# Patient Record
Sex: FEMALE | Race: WHITE | NOT HISPANIC OR LATINO | Employment: OTHER | URBAN - METROPOLITAN AREA
[De-identification: names, ages, dates, MRNs, and addresses within clinical notes are randomized per-mention and may not be internally consistent; named-entity substitution may affect disease eponyms.]

---

## 2017-07-01 ENCOUNTER — TELEPHONE (OUTPATIENT)
Dept: FAMILY MEDICINE | Facility: CLINIC | Age: 67
End: 2017-07-01

## 2017-07-01 NOTE — TELEPHONE ENCOUNTER
Please abstract the following data from this visit with this patient into the appropriate field in Epic:    Mammogram done on this date: Nov. 2016 (approximately), by this group: [facility], results were normal.

## 2017-07-13 ENCOUNTER — TRANSFERRED RECORDS (OUTPATIENT)
Dept: HEALTH INFORMATION MANAGEMENT | Facility: CLINIC | Age: 67
End: 2017-07-13

## 2017-09-29 ENCOUNTER — TRANSFERRED RECORDS (OUTPATIENT)
Dept: HEALTH INFORMATION MANAGEMENT | Facility: CLINIC | Age: 67
End: 2017-09-29

## 2017-12-29 ENCOUNTER — TRANSFERRED RECORDS (OUTPATIENT)
Dept: HEALTH INFORMATION MANAGEMENT | Facility: CLINIC | Age: 67
End: 2017-12-29

## 2018-07-02 ENCOUNTER — TRANSFERRED RECORDS (OUTPATIENT)
Dept: HEALTH INFORMATION MANAGEMENT | Facility: CLINIC | Age: 68
End: 2018-07-02

## 2018-12-10 ENCOUNTER — TRANSFERRED RECORDS (OUTPATIENT)
Dept: HEALTH INFORMATION MANAGEMENT | Facility: CLINIC | Age: 68
End: 2018-12-10

## 2018-12-26 ENCOUNTER — TRANSFERRED RECORDS (OUTPATIENT)
Dept: HEALTH INFORMATION MANAGEMENT | Facility: CLINIC | Age: 68
End: 2018-12-26

## 2019-03-27 ENCOUNTER — TRANSFERRED RECORDS (OUTPATIENT)
Dept: HEALTH INFORMATION MANAGEMENT | Facility: CLINIC | Age: 69
End: 2019-03-27

## 2019-05-03 ENCOUNTER — HOSPITAL ENCOUNTER (OUTPATIENT)
Dept: CT IMAGING | Facility: CLINIC | Age: 69
Discharge: HOME | End: 2019-05-03
Attending: PSYCHIATRY & NEUROLOGY | Admitting: PSYCHIATRY & NEUROLOGY
Payer: COMMERCIAL

## 2019-05-03 DIAGNOSIS — K11.7 DROOLING: ICD-10-CM

## 2019-05-03 LAB
CREAT BLD-MCNC: 1.1 MG/DL (ref 0.52–1.04)
GFR SERPL CREATININE-BSD FRML MDRD: 49 ML/MIN/{1.73_M2}

## 2019-05-03 PROCEDURE — 70470 CT HEAD/BRAIN W/O & W/DYE: CPT

## 2019-05-03 PROCEDURE — 82565 ASSAY OF CREATININE: CPT

## 2019-05-03 PROCEDURE — 25000128 H RX IP 250 OP 636: Performed by: PSYCHIATRY & NEUROLOGY

## 2019-05-03 RX ORDER — IOPAMIDOL 755 MG/ML
500 INJECTION, SOLUTION INTRAVASCULAR ONCE
Status: COMPLETED | OUTPATIENT
Start: 2019-05-03 | End: 2019-05-03

## 2019-05-03 RX ADMIN — IOPAMIDOL 50 ML: 755 INJECTION, SOLUTION INTRAVENOUS at 09:41

## 2019-05-03 RX ADMIN — SODIUM CHLORIDE 30 ML: 9 INJECTION, SOLUTION INTRAVENOUS at 09:44

## 2019-06-27 ENCOUNTER — TRANSFERRED RECORDS (OUTPATIENT)
Dept: HEALTH INFORMATION MANAGEMENT | Facility: CLINIC | Age: 69
End: 2019-06-27

## 2019-09-25 ENCOUNTER — TRANSFERRED RECORDS (OUTPATIENT)
Dept: HEALTH INFORMATION MANAGEMENT | Facility: CLINIC | Age: 69
End: 2019-09-25

## 2019-10-01 ENCOUNTER — HEALTH MAINTENANCE LETTER (OUTPATIENT)
Age: 69
End: 2019-10-01

## 2019-12-15 ENCOUNTER — HEALTH MAINTENANCE LETTER (OUTPATIENT)
Age: 69
End: 2019-12-15

## 2019-12-20 ENCOUNTER — TRANSFERRED RECORDS (OUTPATIENT)
Dept: HEALTH INFORMATION MANAGEMENT | Facility: CLINIC | Age: 69
End: 2019-12-20

## 2019-12-20 LAB — RETINOPATHY: NEGATIVE

## 2020-03-09 ENCOUNTER — OFFICE VISIT (OUTPATIENT)
Dept: FAMILY MEDICINE | Facility: CLINIC | Age: 70
End: 2020-03-09
Payer: COMMERCIAL

## 2020-03-09 ENCOUNTER — TELEPHONE (OUTPATIENT)
Dept: FAMILY MEDICINE | Facility: CLINIC | Age: 70
End: 2020-03-09

## 2020-03-09 VITALS
HEIGHT: 65 IN | HEART RATE: 78 BPM | RESPIRATION RATE: 20 BRPM | SYSTOLIC BLOOD PRESSURE: 120 MMHG | WEIGHT: 215 LBS | BODY MASS INDEX: 35.82 KG/M2 | DIASTOLIC BLOOD PRESSURE: 60 MMHG | TEMPERATURE: 98.2 F

## 2020-03-09 DIAGNOSIS — E66.01 MORBID OBESITY (H): ICD-10-CM

## 2020-03-09 DIAGNOSIS — H33.22 LEFT RETINAL DETACHMENT: ICD-10-CM

## 2020-03-09 DIAGNOSIS — E11.9 CONTROLLED TYPE 2 DIABETES MELLITUS WITHOUT COMPLICATION, WITHOUT LONG-TERM CURRENT USE OF INSULIN (H): ICD-10-CM

## 2020-03-09 DIAGNOSIS — Z01.818 PREOP GENERAL PHYSICAL EXAM: Primary | ICD-10-CM

## 2020-03-09 DIAGNOSIS — F32.0 MILD MAJOR DEPRESSION (H): ICD-10-CM

## 2020-03-09 LAB — HBA1C MFR BLD: 5.9 % (ref 0–5.6)

## 2020-03-09 PROCEDURE — 99204 OFFICE O/P NEW MOD 45 MIN: CPT | Performed by: NURSE PRACTITIONER

## 2020-03-09 PROCEDURE — 83036 HEMOGLOBIN GLYCOSYLATED A1C: CPT | Performed by: NURSE PRACTITIONER

## 2020-03-09 PROCEDURE — 36415 COLL VENOUS BLD VENIPUNCTURE: CPT | Performed by: NURSE PRACTITIONER

## 2020-03-09 RX ORDER — BUPROPION HYDROCHLORIDE 300 MG/1
TABLET ORAL
COMMUNITY
Start: 2014-07-01

## 2020-03-09 ASSESSMENT — MIFFLIN-ST. JEOR: SCORE: 1496.11

## 2020-03-09 NOTE — PROGRESS NOTES
[address], [address]  [facility] [address]  [phone]  Dept: [phone]    PRE-OP EVALUATION:  Today's date: 3/9/2020    David Mckeon (: 1950) presents for pre-operative evaluation assessment as requested by Dr. Frank Rogel.  She requires evaluation and anesthesia risk assessment prior to undergoing surgery/procedure for treatment of left Vitrectomy Posterior .    Fax number for surgical facility: [phone]  Primary Physician: Bibi Orta  Type of Anesthesia Anticipated: Sedation    Patient has a Health Care Directive or Living Will:  YES on file    Preop Questions 3/6/2020   Who is doing your surgery? Frank Rogel MD   What are you having done? repair of retina detachment left eye   Date of Surgery/Procedure: 2020   Facility or Hospital where procedure/surgery will be performed: [facility]   1.  Do you have a history of Heart attack, stroke, stent, coronary bypass surgery, or other heart surgery? No   2.  Do you ever have any pain or discomfort in your chest? No   3.  Do you have a history of  Heart Failure? No   4.   Are you troubled by shortness of breath when:  walking on a level surface, or up a slight hill, or at night? No   5.  Do you currently have a cold, bronchitis or other respiratory infection? No   6.  Do you have a cough, shortness of breath, or wheezing? No   7.  Do you sometimes get pains in the calves of your legs when you walk? No   8. Do you or anyone in your family have previous history of blood clots? No   9.  Do you or does anyone in your family have a serious bleeding problem such as prolonged bleeding following surgeries or cuts? No   10. Have you ever had problems with anemia or been told to take iron pills? No   11. Have you had any abnormal blood loss such as black, tarry or bloody stools, or abnormal vaginal bleeding? No   12. Have you ever had a blood transfusion? No   13. Have  you or any of your relatives ever had problems with anesthesia? No   14. Do you have sleep apnea, excessive snoring or daytime drowsiness? No   15. Do you have any prosthetic heart valves? No   16. Do you have prosthetic joints? YES - right knee   17. Is there any chance that you may be pregnant? No         HPI:     HPI related to upcoming procedure: Was seen for eye exam in December.  Had subsequent eye exam and told she had a detached retina.        Hx of T2DM and HTN.  More recently this has been managed by [facility].  She reports her A1c is usually between 6.5-7.0 and her BP has been well controlled on medications.     See problem list for active medical problems.  Problems all longstanding and stable, except as noted/documented.  See ROS for pertinent symptoms related to these conditions.      MEDICAL HISTORY:     Patient Active Problem List    Diagnosis Date Noted     ACP (advance care planning) 08/10/2016     Priority: Medium     Advance Care Planning 8/10/2016: Receipt of ACP document:  Received: Health Care Directive which was witnessed or notarized on 1/5/07.  Document previously scanned on 5/26/16.  Validation form completed and sent to be scanned.  Code Status reflects choices in most recent ACP document.  Confirmed/documented designated decision maker(s).  Added by Justine Leal RN, Advance Care Planning Liaison.         S/P total knee replacement using cement, right 05/23/2016     Priority: Medium     Type 2 diabetes mellitus, controlled (H) 05/06/2016     Priority: Medium     Essential hypertension, benign 05/06/2016     Priority: Medium     Morbid obesity (H) 10/25/2015     Priority: Medium     Diverticulosis of large intestine without hemorrhage 07/28/2015     Priority: Medium     Hemorrhoids 07/28/2015     Priority: Medium     S/P colonoscopy with polypectomy 07/28/2015     Priority: Medium     Mild major depression (H) 06/30/2014     Priority: Medium     Quality         Hyperlipidemia with target  LDL less than 100 06/30/2014     Priority: Medium     Diagnosis updated by automated process. Provider to review and confirm.       History of colonic polyps 05/19/2014     Priority: Medium     CARDIOVASCULAR SCREENING; LDL GOAL LESS THAN 130 05/19/2014     Priority: Medium      Past Medical History:   Diagnosis Date     High cholesterol      Hypertension      Mild major depression (H) 6/30/2014     Osteoarthritis     right knee     Type 2 diabetes mellitus (H)      Past Surgical History:   Procedure Laterality Date     ARTHROPLASTY KNEE Right 5/23/2016    Procedure: ARTHROPLASTY KNEE;  Surgeon: Pankaj Schuster MD;  Location: [facility] OR     CARPAL TUNNEL RELEASE RT/LT Bilateral      COLONOSCOPY  6/2015     EYE SURGERY      glasses     SOFT TISSUE SURGERY      carpal tunnel-both hands     STAPEDECTOMY Bilateral      Current Outpatient Medications   Medication Sig Dispense Refill     ACE NOT PRESCRIBED, INTENTIONAL, 1 each ACE Inhibitor not prescribed due to Allergy 0 each 0     amLODIPine (NORVASC) 10 MG tablet Take 1 tablet (10 mg) by mouth daily 90 tablet 0     aspirin (ASA) 81 MG EC tablet Take 81 mg by mouth daily       buPROPion (WELLBUTRIN XL) 300 MG 24 hr tablet        calcium carbonate (OS-SAM) 1500 (600 Ca) MG tablet Take by mouth 2 times daily (with meals)       carvedilol (COREG) 6.25 MG tablet Take 1 tablet (6.25 mg) by mouth 2 times daily (with meals) 60 tablet 1     Cholecalciferol (VITAMIN D3) 2000 UNITS CAPS Take 1 capsule by mouth 2 times daily        hydrochlorothiazide (MICROZIDE) 12.5 MG capsule Take 2 capsules (25 mg) by mouth daily 60 capsule 1     losartan (COZAAR) 100 MG tablet Take 100 mg by mouth daily       metFORMIN (GLUCOPHAGE) 1000 MG tablet Take 1,000 mg by mouth 2 times daily (with meals)       multivitamin, therapeutic with minerals (THERA-VIT-M) TABS Take 1 tablet by mouth daily       phentermine (ADIPEX-P) 37.5 MG tablet Take 37.5 mg by mouth every morning (before breakfast)        acetaminophen (TYLENOL) 325 MG tablet Take 2 tablets (650 mg) by mouth every 4 hours as needed for other (surgical pain) 50 tablet 0     aspirin  MG EC tablet Take 1 tablet (162 mg) by mouth daily 42 tablet 0     blood glucose (ACCU-CHEK ARTHUR PLUS) test strip Use to test blood sugar 3 times daily or as directed. 3 Box 3     buPROPion (WELLBUTRIN XL) 150 MG 24 hr tablet Take 1 tablet (150 mg) by mouth every morning 90 tablet 0     Calcium Polycarbophil (FIBER-CAPS PO) Take 1,500 mg by mouth 2 times daily 1.5 PILLS IN THE am AND 1 PILL IN THE pm       Calcium-Magnesium-Vitamin D (CALCIUM 500 PO) Take 3 tablets by mouth 2 times daily       canaliflozin (INVOKANA) tablet Take 300 mg by mouth every morning (before breakfast)       docusate sodium (COLACE) 100 MG tablet Take 100 mg by mouth 3 times daily       metFORMIN (GLUCOPHAGE) 850 MG tablet Take 1 tablet (850 mg) by mouth 2 times daily (with meals) 180 tablet 0     order for DME Equipment being ordered: Walker Wheels () and Walker ()  Treatment Diagnosis: gait impairment s/p R TKA 1 each 0     oxyCODONE (ROXICODONE) 5 MG immediate release tablet Take 1-2 tablets (5-10 mg) by mouth every 4 hours as needed for moderate to severe pain 48 tablet 0     simvastatin (ZOCOR) 20 MG tablet Take 1 tablet (20 mg) by mouth At Bedtime 90 tablet 0     OTC products: None, except as noted above    Allergies   Allergen Reactions     Aleve [Naproxen] Nausea     Ibuprofen Nausea     Lisinopril Cough     DRY COUGH      Latex Allergy: NO    Social History     Tobacco Use     Smoking status: Former Smoker     Packs/day: 0.00     Years: 35.00     Pack years: 0.00     Types: Cigarettes     Start date: 1968     Last attempt to quit: 2003     Years since quittin.7     Smokeless tobacco: Never Used   Substance Use Topics     Alcohol use: Yes     Comment: occasional     History   Drug Use No       REVIEW OF SYSTEMS:   CONSTITUTIONAL: NEGATIVE for fever,  "chills, change in weight  ENT/MOUTH: NEGATIVE for ear, mouth and throat problems  RESP: NEGATIVE for significant cough or SOB  CV: NEGATIVE for chest pain, palpitations or peripheral edema    EXAM:   /60 (BP Location: Right arm, Patient Position: Sitting, Cuff Size: Adult Large)   Pulse 78   Temp 98.2  F (36.8  C) (Oral)   Resp 20   Ht 1.651 m (5' 5\")   Wt 97.5 kg (215 lb)   BMI 35.78 kg/m    GENERAL APPEARANCE: healthy, alert and no distress  HENT: ear canals and TM's normal and nose and mouth without ulcers or lesions  RESP: lungs clear to auscultation - no rales, rhonchi or wheezes  CV: regular rate and rhythm, normal S1 S2, no S3 or S4 and no murmur, click or rub   NEURO: Normal strength and tone, sensory exam grossly normal, mentation intact and speech normal    DIAGNOSTICS:   No labs or EKG required for low risk surgery (cataract, skin procedure, breast biopsy, etc)    Recent Labs   Lab Test 05/25/16  0643 05/24/16  0625 05/23/16  1330 05/06/16  1134 12/03/15  12/15/14 10/30/14  0828  05/19/14  1045   HGB 12.0 12.2  --  13.6  --   --   --  13.5  --  15.4   PLT  --   --   --  366  --   --   --  401  --  362   NA  --   --   --   --   --   --   --  137  --  138   POTASSIUM  --   --  3.9  --   --   --  4.4 4.2  --  4.6   CR 0.71  --  0.73  --  0.68  --  0.73 0.65  --  0.66   A1C  --   --   --  6.0 5.9   < > 6.1* 6.3*   < > 11.5*    < > = values in this interval not displayed.      Results for orders placed or performed in visit on 03/09/20   HEMOGLOBIN A1C     Status: Abnormal   Result Value Ref Range    Hemoglobin A1C 5.9 (H) 0 - 5.6 %       IMPRESSION:   Reason for surgery/procedure: detached retina   Diagnosis/reason for consult: preoperative evaluation     The proposed surgical procedure is considered LOW risk.    REVISED CARDIAC RISK INDEX  The patient has the following serious cardiovascular risks for perioperative complications such as (MI, PE, VFib and 3  AV Block):  No serious cardiac " risks  INTERPRETATION: 0 risks: Class I (very low risk - 0.4% complication rate)    The patient has the following additional risks for perioperative complications:  No identified additional risks      ICD-10-CM    1. Preop general physical exam  Z01.818 HEMOGLOBIN A1C   2. Controlled type 2 diabetes mellitus without complication, without long-term current use of insulin (H)  E11.9 HEMOGLOBIN A1C, controlled   3. Left retinal detachment  H33.22    4. Mild major depression (H)  F32.0 Daily wellbutrin    5. Morbid obesity (H)  E66.01        RECOMMENDATIONS:         --Patient is to take all scheduled medications on the day of surgery EXCEPT for modifications listed below.    Diabetes Medication Use  -----Hold usual oral and non-insulin diabetic meds (e.g. Metformin, Actos, Glipizide) while NPO.     APPROVAL GIVEN to proceed with proposed procedure, without further diagnostic evaluation       Signed Electronically by: ELVI Trujillo CNP    Copy of this evaluation report is provided to requesting physician.    [facility] Preop Guidelines    Revised Cardiac Risk Index

## 2020-03-09 NOTE — TELEPHONE ENCOUNTER
----- Message from ELVI Gonzalez CNP sent at 3/9/2020  3:36 PM CDT -----  Regarding: fax preop  Please fax preop.     Thanks,     Yaima Roman CNP

## 2020-07-01 ENCOUNTER — OFFICE VISIT (OUTPATIENT)
Dept: FAMILY MEDICINE | Facility: CLINIC | Age: 70
End: 2020-07-01
Payer: COMMERCIAL

## 2020-07-01 VITALS
TEMPERATURE: 98.3 F | DIASTOLIC BLOOD PRESSURE: 76 MMHG | SYSTOLIC BLOOD PRESSURE: 138 MMHG | WEIGHT: 228.2 LBS | HEART RATE: 76 BPM | RESPIRATION RATE: 16 BRPM | BODY MASS INDEX: 37.97 KG/M2

## 2020-07-01 DIAGNOSIS — Z01.818 PREOP GENERAL PHYSICAL EXAM: Primary | ICD-10-CM

## 2020-07-01 PROCEDURE — 99214 OFFICE O/P EST MOD 30 MIN: CPT | Performed by: FAMILY MEDICINE

## 2020-07-01 ASSESSMENT — ANXIETY QUESTIONNAIRES
IF YOU CHECKED OFF ANY PROBLEMS ON THIS QUESTIONNAIRE, HOW DIFFICULT HAVE THESE PROBLEMS MADE IT FOR YOU TO DO YOUR WORK, TAKE CARE OF THINGS AT HOME, OR GET ALONG WITH OTHER PEOPLE: NOT DIFFICULT AT ALL
1. FEELING NERVOUS, ANXIOUS, OR ON EDGE: NOT AT ALL
5. BEING SO RESTLESS THAT IT IS HARD TO SIT STILL: NOT AT ALL
GAD7 TOTAL SCORE: 0
7. FEELING AFRAID AS IF SOMETHING AWFUL MIGHT HAPPEN: NOT AT ALL
3. WORRYING TOO MUCH ABOUT DIFFERENT THINGS: NOT AT ALL
2. NOT BEING ABLE TO STOP OR CONTROL WORRYING: NOT AT ALL
6. BECOMING EASILY ANNOYED OR IRRITABLE: NOT AT ALL

## 2020-07-01 ASSESSMENT — PATIENT HEALTH QUESTIONNAIRE - PHQ9
SUM OF ALL RESPONSES TO PHQ QUESTIONS 1-9: 0
5. POOR APPETITE OR OVEREATING: NOT AT ALL

## 2020-07-01 NOTE — PATIENT INSTRUCTIONS
Continue to stop aspirin therapy until advised by eye surgeon when you can restart this.     Before Your Surgery      Call your surgeon if there is any change in your health. This includes signs of a cold or flu (such as a sore throat, runny nose, cough, rash or fever).    Do not smoke, drink alcohol or take over the counter medicine (unless your surgeon or primary care doctor tells you to) for the 24 hours before and after surgery.    If you take prescribed drugs: Follow your doctor s orders about which medicines to take and which to stop until after surgery.    Eating and drinking prior to surgery: follow the instructions from your surgeon    Take a shower or bath the night before surgery. Use the soap your surgeon gave you to gently clean your skin. If you do not have soap from your surgeon, use your regular soap. Do not shave or scrub the surgery site.  Wear clean pajamas and have clean sheets on your bed.

## 2020-07-01 NOTE — NURSING NOTE
"Chief Complaint   Patient presents with     Pre-Op Exam     Initial /76 (BP Location: Right arm, Patient Position: Sitting, Cuff Size: Adult Regular)   Pulse 76   Temp 98.3  F (36.8  C) (Oral)   Resp 16   Wt 103.5 kg (228 lb 3.2 oz)   BMI 37.97 kg/m   Estimated body mass index is 37.97 kg/m  as calculated from the following:    Height as of 3/9/20: 1.651 m (5' 5\").    Weight as of this encounter: 103.5 kg (228 lb 3.2 oz).  BP completed using cuff size regular long right arm    Lisa Magill, CMA    "

## 2020-07-01 NOTE — PROGRESS NOTES
[facility]  [id number] [place] [id number] [phone]  Dept: [phone]    PRE-OP EVALUATION:  Today's date: 2020    David Mckeon (: 1950) presents for pre-operative evaluation assessment as requested by Dr. Rogel.  She requires evaluation and anesthesia risk assessment prior to undergoing surgery/procedure for treatment of scar tissue removal of retina .    Fax number for surgical facility: [phone]  Primary Physician: Bibi Orta  Type of Anesthesia Anticipated: Local and Sedation     Patient has a Health Care Directive or Living Will:  YES     Preop Questions 2020   Who is doing your surgery? Dr rogel   What are you having done? virectomy   Date of Surgery/Procedure: 2020   Facility or Hospital where procedure/surgery will be performed: [facility]   1.  Do you have a history of Heart attack, stroke, stent, coronary bypass surgery, or other heart surgery? No   2.  Do you ever have any pain or discomfort in your chest? No   3.  Do you have a history of  Heart Failure? No   4.   Are you troubled by shortness of breath when:  walking on a level surface, or up a slight hill, or at night? No   5.  Do you currently have a cold, bronchitis or other respiratory infection? No   6.  Do you have a cough, shortness of breath, or wheezing? No   7.  Do you sometimes get pains in the calves of your legs when you walk? No   8. Do you or anyone in your family have previous history of blood clots? No   9.  Do you or does anyone in your family have a serious bleeding problem such as prolonged bleeding following surgeries or cuts? No   10. Have you ever had problems with anemia or been told to take iron pills? No   11. Have you had any abnormal blood loss such as black, tarry or bloody stools, or abnormal vaginal bleeding? No   12. Have you ever had a blood transfusion? No   13. Have you or any of your relatives ever had  problems with anesthesia? No   14. Do you have sleep apnea, excessive snoring or daytime drowsiness? No   15. Do you have any prosthetic heart valves? No   16. Do you have prosthetic joints? YES    17. Is there any chance that you may be pregnant? No         HPI:     HPI related to upcoming procedure: retinal surgery on left eye.  Anesthesia will be a local anesthetic. She is having a COVID-19 test done through [facility] pharmacy tomorrow in preparation for the upcoming surgery. She normally gets all of her oral medications prescribed through her endocrinologist, and she also has an OB/GYN provider. No her blood glucose last week was about 110 per patient.    Patient is stopping ASA therapy for 7 days before surgery.   .  MEDICAL HISTORY:     Patient Active Problem List    Diagnosis Date Noted     S/P total knee replacement using cement, right 05/23/2016     Priority: Medium     Type 2 diabetes mellitus, controlled (H) 05/06/2016     Priority: Medium     Essential hypertension, benign 05/06/2016     Priority: Medium     Morbid obesity (H) 10/25/2015     Priority: Medium     Diverticulosis of large intestine without hemorrhage 07/28/2015     Priority: Medium     Hemorrhoids 07/28/2015     Priority: Medium     S/P colonoscopy with polypectomy 07/28/2015     Priority: Medium     Mild major depression (H) 06/30/2014     Priority: Medium     Quality         Hyperlipidemia with target LDL less than 100 06/30/2014     Priority: Medium     Diagnosis updated by automated process. Provider to review and confirm.       History of colonic polyps 05/19/2014     Priority: Medium     CARDIOVASCULAR SCREENING; LDL GOAL LESS THAN 130 05/19/2014     Priority: Medium      Past Medical History:   Diagnosis Date     High cholesterol      Hypertension      Mild major depression (H) 6/30/2014     Osteoarthritis     right knee     Type 2 diabetes mellitus (H)      Past Surgical History:   Procedure Laterality Date     ARTHROPLASTY KNEE Right  5/23/2016    Procedure: ARTHROPLASTY KNEE;  Surgeon: Pankaj Schuster MD;  Location: [facility] OR     CARPAL TUNNEL RELEASE RT/LT Bilateral      COLONOSCOPY  6/2015     EYE SURGERY      glasses     SOFT TISSUE SURGERY      carpal tunnel-both hands     STAPEDECTOMY Bilateral      Current Outpatient Medications   Medication Sig Dispense Refill     ACE NOT PRESCRIBED, INTENTIONAL, 1 each ACE Inhibitor not prescribed due to Allergy 0 each 0     amLODIPine (NORVASC) 10 MG tablet Take 1 tablet (10 mg) by mouth daily 90 tablet 0     aspirin (ASA) 81 MG EC tablet Take 81 mg by mouth daily       blood glucose (ACCU-CHEK ARTHUR PLUS) test strip Use to test blood sugar 3 times daily or as directed. 3 Box 3     buPROPion (WELLBUTRIN XL) 300 MG 24 hr tablet        Calcium Polycarbophil (FIBER-CAPS PO) Take 1,500 mg by mouth 2 times daily 1.5 PILLS IN THE am AND 1 PILL IN THE pm       canaliflozin (INVOKANA) tablet Take 300 mg by mouth every morning (before breakfast)       carvedilol (COREG) 6.25 MG tablet Take 1 tablet (6.25 mg) by mouth 2 times daily (with meals) 60 tablet 1     Cholecalciferol (VITAMIN D3) 2000 UNITS CAPS Take 1 capsule by mouth 2 times daily        docusate sodium (COLACE) 100 MG tablet Take 100 mg by mouth 3 times daily       hydrochlorothiazide (MICROZIDE) 12.5 MG capsule Take 2 capsules (25 mg) by mouth daily 60 capsule 1     losartan (COZAAR) 100 MG tablet Take 100 mg by mouth daily       metFORMIN (GLUCOPHAGE) 1000 MG tablet Take 1,000 mg by mouth 2 times daily (with meals)       multivitamin, therapeutic with minerals (THERA-VIT-M) TABS Take 1 tablet by mouth daily       phentermine (ADIPEX-P) 37.5 MG tablet Take 37.5 mg by mouth every morning (before breakfast)       simvastatin (ZOCOR) 20 MG tablet Take 1 tablet (20 mg) by mouth At Bedtime 90 tablet 0     OTC products: None, except as noted above    Allergies   Allergen Reactions     Aleve [Naproxen] Nausea     Ibuprofen Nausea     Lisinopril Cough      DRY COUGH      Latex Allergy: NO    Social History     Tobacco Use     Smoking status: Former Smoker     Packs/day: 0.00     Years: 35.00     Pack years: 0.00     Types: Cigarettes     Start date: 1968     Last attempt to quit: 2003     Years since quittin.0     Smokeless tobacco: Never Used   Substance Use Topics     Alcohol use: Yes     Comment: occasional     History   Drug Use No       REVIEW OF SYSTEMS:   Constitutional, neuro, ENT, endocrine, pulmonary, cardiac, gastrointestinal, genitourinary, musculoskeletal, integument and psychiatric systems are negative.  At time of exam, she states she feels well overall, with no acute chief complaints.    EXAM:   /76 (BP Location: Right arm, Patient Position: Sitting, Cuff Size: Adult Regular)   Pulse 76   Temp 98.3  F (36.8  C) (Oral)   Resp 16   Wt 103.5 kg (228 lb 3.2 oz)   BMI 37.97 kg/m    Vital signs reviewed.  Patient is in no acute appearing distress.  Breathing appears nonlabored.  Patient is alert and oriented ×3.  She makes good eye contact, and is very pleasant.  Her speech is clear and fluent.  She gets up and down from exam room chair and table without difficulty.  ENT: Ear exam shows bilateral tympanic membranes to be clear without injection, nasal turbinates show no injection or edema, no pharyngeal injection or exudate.  Eyes: No scleral, lid, or periorbital injection or edema noted.  No eye mattering noted.  Corneas are clear.  No scleral discoloration noted.  Pupils are equal round and reactive to light and normal sizes.  She has a normal resting eye gaze.  Neck: Supple without lymphadenopathy, abnormal masses, or palpable thyroid abnormality.  Heart: Heart rate is regular without murmur.  Lungs: Lungs are clear to auscultation with good airflow bilaterally.  Back: No areas of tenderness.  Abdomen:  Abdomen is soft, nontender.  No palpable abnormal masses or organomegaly.  Bowel sounds are normal.  Skin/extremities: Warm and  dry, with no ankle edema.  Neuro exam: No acute focal neurological deficits.  Psych: Patient is very pleasant, she answers questions appropriately.    DIAGNOSTICS:   No new labs done.  No ECG required.    Hemoglobin A1c from 03/09/2020 was 5.9.     IMPRESSION:     The proposed surgical procedure is considered LOW risk.    REVISED CARDIAC RISK INDEX  The patient has the following serious cardiovascular risks for perioperative complications such as (MI, PE, VFib and 3  AV Block):  No serious cardiac risks  INTERPRETATION: 0 risks: Class I (very low risk - 0.4% complication rate)    The patient has the following additional risks for perioperative complications:  No identified additional risks  Morbid obesity      ICD-10-CM    1. Preop general physical exam  Z01.818     Reason for surgery/procedure: Retina surgery left eye       RECOMMENDATIONS:     --Patient is to take all scheduled medications on the day of surgery EXCEPT for modifications listed below.  Stop aspirin therapy until advised when you can restart this by eye physician.     APPROVAL GIVEN to proceed with proposed procedure, without further diagnostic evaluation       Signed Electronically by: Leroy Gutierrez DO    Copy of this evaluation report is provided to requesting physician.    [facility] Preop Guidelines    Revised Cardiac Risk Index

## 2020-07-02 ASSESSMENT — ANXIETY QUESTIONNAIRES: GAD7 TOTAL SCORE: 0

## 2020-07-06 ENCOUNTER — DOCUMENTATION ONLY (OUTPATIENT)
Dept: OTHER | Facility: CLINIC | Age: 70
End: 2020-07-06

## 2020-11-02 ENCOUNTER — OFFICE VISIT (OUTPATIENT)
Dept: FAMILY MEDICINE | Facility: CLINIC | Age: 70
End: 2020-11-02
Payer: COMMERCIAL

## 2020-11-02 VITALS
OXYGEN SATURATION: 99 % | HEART RATE: 77 BPM | TEMPERATURE: 97.6 F | BODY MASS INDEX: 37.28 KG/M2 | RESPIRATION RATE: 16 BRPM | SYSTOLIC BLOOD PRESSURE: 131 MMHG | WEIGHT: 224 LBS | DIASTOLIC BLOOD PRESSURE: 74 MMHG

## 2020-11-02 DIAGNOSIS — E66.01 MORBID OBESITY (H): ICD-10-CM

## 2020-11-02 DIAGNOSIS — E11.9 CONTROLLED TYPE 2 DIABETES MELLITUS WITHOUT COMPLICATION, WITHOUT LONG-TERM CURRENT USE OF INSULIN (H): ICD-10-CM

## 2020-11-02 DIAGNOSIS — I10 ESSENTIAL HYPERTENSION, BENIGN: ICD-10-CM

## 2020-11-02 DIAGNOSIS — Z01.818 PREOP GENERAL PHYSICAL EXAM: Primary | ICD-10-CM

## 2020-11-02 LAB
ERYTHROCYTE [DISTWIDTH] IN BLOOD BY AUTOMATED COUNT: 13.8 % (ref 10–15)
HBA1C MFR BLD: 6 % (ref 0–5.6)
HCT VFR BLD AUTO: 45.3 % (ref 35–47)
HGB BLD-MCNC: 14.9 G/DL (ref 11.7–15.7)
MCH RBC QN AUTO: 30 PG (ref 26.5–33)
MCHC RBC AUTO-ENTMCNC: 32.9 G/DL (ref 31.5–36.5)
MCV RBC AUTO: 91 FL (ref 78–100)
PLATELET # BLD AUTO: 389 10E9/L (ref 150–450)
RBC # BLD AUTO: 4.96 10E12/L (ref 3.8–5.2)
WBC # BLD AUTO: 9.5 10E9/L (ref 4–11)

## 2020-11-02 PROCEDURE — 99214 OFFICE O/P EST MOD 30 MIN: CPT | Performed by: NURSE PRACTITIONER

## 2020-11-02 PROCEDURE — 83036 HEMOGLOBIN GLYCOSYLATED A1C: CPT | Performed by: NURSE PRACTITIONER

## 2020-11-02 PROCEDURE — 36415 COLL VENOUS BLD VENIPUNCTURE: CPT | Performed by: NURSE PRACTITIONER

## 2020-11-02 PROCEDURE — 80048 BASIC METABOLIC PNL TOTAL CA: CPT | Performed by: NURSE PRACTITIONER

## 2020-11-02 PROCEDURE — 82043 UR ALBUMIN QUANTITATIVE: CPT | Performed by: NURSE PRACTITIONER

## 2020-11-02 PROCEDURE — 80061 LIPID PANEL: CPT | Performed by: NURSE PRACTITIONER

## 2020-11-02 PROCEDURE — 85027 COMPLETE CBC AUTOMATED: CPT | Performed by: NURSE PRACTITIONER

## 2020-11-02 NOTE — PATIENT INSTRUCTIONS
"Hold Aspirin and multivitamin until after surgery.   Hold canaliflozin morning of surgery.   Hold metformin morning of surgery.   Hold hydrochlorothiazide morning of surgery.   Hold losartan morning of surgery.   Preparing for Your Surgery  Getting started  A surgery nurse will call you to review your health history and instructions. They will give you an arrival time based on your scheduled surgery time.  Please be ready to share the following:    Your doctor's clinic name and phone number    Your medical, surgical and anesthesia history    A list of allergies and sensitivities    A list of medicines, including herbal treatments and over-the-counter drugs    Whether the patient has a legal guardian (ask how to send us the papers in advance)  If your child is having surgery, please ask for a copy of Preparing for Your Child's Surgery.    Preparing for surgery    Within 30 days of surgery: Have an exam at your family clinic (primary care clinic), or go to a pre-operative clinic. This exam is called a \"History and Physical,\" or H&P.    At your H&P exam, talk to your care team about all medicines you take. If you need to stop any medicines before surgery, ask when to start taking them again.  ? We do this for your safety. Many medicines can make you bleed too much during surgery. Some change how well surgery (anesthesia) drugs work.    Call your insurance company to see what it will and won't pay for. Ask if they need to pre-approve the surgery. (If no insurance, call [phone].)    Call your surgeon's clinic if there's any change in your health. This includes signs of a cold or flu (sore throat, runny nose, cough, rash, fever). It also includes a scrape or scratch near the surgery site.    If you have questions on the day of surgery, call your surgery center.  Eating and drinking guidelines  For your safety: Unless your surgeon tells you otherwise, follow the guidelines below.    Eat and drink as usual until 8 " hours before surgery. After that, no food or milk.    Drink clear liquids until 2 hours before surgery. These are liquids you can see through, like water, Gatorade and Propel Water. You may also have black coffee and tea (no cream or milk).    Nothing by mouth within 2 hours of surgery. This includes gum, candy and breath mints.    Stop alcohol the midnight before surgery.    If your family clinic tells you to take medicine on the morning of surgery, it's okay to take it with a sip of water.  Preventing infection    Shower or bathe the night before and morning of your surgery. Follow the instructions your clinic gave you. (If no instructions, use regular soap.)    Don't shave or clip hair near your surgery site. This can lead to skin infection.    Don't smoke the morning of surgery. Smoking increases the risk of infection. You may chew nicotine gum up to 2 hours before surgery. A nicotine patch is okay.  ? Note: Some surgeries require you to completely quit smoking and nicotine. Check with your surgeon.    Your care team will make every effort to keep you safe from infection. We will:  ? Clean our hands often with soap and water (or an alcohol-based hand rub).  ? Clean the skin at your surgery site with a special soap that kills germs. We'll also remove hair from the site as needed.  ? Wear special hair covers, masks, gowns and gloves during surgery.  ? Give antibiotic medicine, if prescribed. Not all surgeries need antibiotics.  What to bring on the day of surgery    Photo ID and insurance card    Copy of your health care directive, if you have one    Glasses and hearing aides (bring cases)  ? You can't wear contacts during surgery    Inhaler and eye drops, if you use them (tell us about these when you arrive)    CPAP machine or breathing device, if you use them    A few personal items, if spending the night    If you have . . .  ? A pacemaker or ICD (cardiac defibrillator): Bring the ID card.  ? An implanted  stimulator: Bring the remote control.  ? A legal guardian: Bring a copy of the certified (court-stamped) guardianship papers.  Please remove any jewelry, including body piercings. Leave jewelry and other valuables at home.  If you're going home the day of surgery  Important: If you don't follow the rules below, we must cancel your surgery.     Arrange for someone to drive you home after surgery. You may not drive, take a taxi or take public transportation by yourself (unless you'll have local anesthesia only).    Arrange for a responsible adult to stay with you overnight. If you don't, we may keep you in the hospital overnight, and you may need to pay the costs yourself.  Questions?   If you have any questions for your care team, list them here: _________________________________________________________________________________________________________________________________________________________________________________________________________________________________________________________________________________________________________________________  For informational purposes only. Not to replace the advice of your health care provider. Copyright   5746-2674 [facility]. All rights reserved. Clinically reviewed by Rupali Erazo MD. Likva [id number] - REV 07/19.

## 2020-11-02 NOTE — PROGRESS NOTES
[facility]  [address] [id number] [phone]  Dept: [phone]    PRE-OP EVALUATION:  Today's date: 2020    David Mckeon (: 1950) presents for pre-operative evaluation assessment as requested by Dr. De La Cruz .  She requires evaluation and anesthesia risk assessment prior to undergoing surgery/procedure for treatment of Left eye Cataract .    Fax number for surgical facility: [phone]  Primary Physician: Bibi Orta  Type of Anesthesia Anticipated: Local with MAC    Preop Questionnnaire:  Pre-op Questionnaire 2020   Surgery Location: [facility]    Surgeon: -Dr. De La Cruz    Surgery/Procedure: Left eye cataract    Surgery Date: -11/10/20   Time of Surgery: -11am    1. Have you ever had a heart attack or stroke? No   2. Have you ever had surgery on your heart or blood vessels, such as a stent placement, a coronary artery bypass, or surgery on an artery in your head, neck, heart, or legs? No   3. Do you have chest pain with activity? No   4. Do you have a history of  heart failure? No   5. Do you currently have a cold, bronchitis or symptoms of other infection? No   6. Do you have a cough, shortness of breath, or wheezing? No   7. Do you or anyone in your family have previous history of blood clots? No   8. Do you or does anyone in your family have a serious bleeding problem such as prolonged bleeding following surgeries or cuts? No   9. Have you ever had problems with anemia or been told to take iron pills? No   10. Have you had any abnormal blood loss such as black, tarry or bloody stools, or abnormal vaginal bleeding? No   11. Have you ever had a blood transfusion? No   12. Are you willing to have a blood transfusion if it is medically needed before, during, or after your surgery? Yes   13. Have you or any of your relatives ever had problems with anesthesia? No   14. Do you have sleep apnea, excessive  snoring or daytime drowsiness? No   15. Do you have any artifical heart valves or other implanted medical devices like a pacemaker, defibrillator, or continuous glucose monitor? No   16. Do you have artificial joints? YES - Right TKA   17. Are you allergic to latex? No   18. Is there any chance that you may be pregnant? NO         HPI:     HPI related to upcoming procedure: Left IOL for treatment of left cataract.       DEPRESSION - Patient has a long history of Depression of moderate severity requiring medication for control with recent symptoms being stable..Current symptoms of depression include none.     DIABETES - Patient has a longstanding history of DiabetesType Type II . Patient is being treated with oral agents and denies significant side effects. Control has been good. Complicating factors include but are not limited to: hypertension and hyperlipidemia.     HYPERLIPIDEMIA - Patient has a long history of significant Hyperlipidemia requiring medication for treatment with recent good control. Patient reports no problems or side effects with the medication.     HYPERTENSION - Patient has longstanding history of HTN , currently denies any symptoms referable to elevated blood pressure. Specifically denies chest pain, palpitations, dyspnea, orthopnea, PND or peripheral edema. Blood pressure readings have been in normal range. Current medication regimen is as listed below. Patient denies any side effects of medication.       MEDICAL HISTORY:     Patient Active Problem List    Diagnosis Date Noted     S/P total knee replacement using cement, right 05/23/2016     Priority: Medium     Type 2 diabetes mellitus, controlled (H) 05/06/2016     Priority: Medium     Essential hypertension, benign 05/06/2016     Priority: Medium     Morbid obesity (H) 10/25/2015     Priority: Medium     Diverticulosis of large intestine without hemorrhage 07/28/2015     Priority: Medium     Hemorrhoids 07/28/2015     Priority: Medium      S/P colonoscopy with polypectomy 07/28/2015     Priority: Medium     Mild major depression (H) 06/30/2014     Priority: Medium     Quality         Hyperlipidemia with target LDL less than 100 06/30/2014     Priority: Medium     Diagnosis updated by automated process. Provider to review and confirm.       History of colonic polyps 05/19/2014     Priority: Medium     CARDIOVASCULAR SCREENING; LDL GOAL LESS THAN 130 05/19/2014     Priority: Medium      Past Medical History:   Diagnosis Date     High cholesterol      Hypertension      Mild major depression (H) 6/30/2014     Osteoarthritis     right knee     Type 2 diabetes mellitus (H)      Past Surgical History:   Procedure Laterality Date     ARTHROPLASTY KNEE Right 5/23/2016    Procedure: ARTHROPLASTY KNEE;  Surgeon: Pankaj Schuster MD;  Location: [facility] OR     CARPAL TUNNEL RELEASE RT/LT Bilateral      COLONOSCOPY  6/2015     EYE SURGERY      glasses     SOFT TISSUE SURGERY      carpal tunnel-both hands     STAPEDECTOMY Bilateral      Current Outpatient Medications   Medication Sig Dispense Refill     ACE NOT PRESCRIBED, INTENTIONAL, 1 each ACE Inhibitor not prescribed due to Allergy 0 each 0     amLODIPine (NORVASC) 10 MG tablet Take 1 tablet (10 mg) by mouth daily 90 tablet 0     aspirin (ASA) 81 MG EC tablet Take 81 mg by mouth daily       blood glucose (ACCU-CHEK ARTHUR PLUS) test strip Use to test blood sugar 3 times daily or as directed. 3 Box 3     buPROPion (WELLBUTRIN XL) 300 MG 24 hr tablet        Calcium Polycarbophil (FIBER-CAPS PO) Take 1,500 mg by mouth 2 times daily 1.5 PILLS IN THE am AND 1 PILL IN THE pm       canaliflozin (INVOKANA) tablet Take 300 mg by mouth every morning (before breakfast)       carvedilol (COREG) 6.25 MG tablet Take 1 tablet (6.25 mg) by mouth 2 times daily (with meals) 60 tablet 1     Cholecalciferol (VITAMIN D3) 2000 UNITS CAPS Take 1 capsule by mouth 2 times daily        docusate sodium (COLACE) 100 MG tablet Take 100 mg  by mouth 3 times daily       hydrochlorothiazide (MICROZIDE) 12.5 MG capsule Take 2 capsules (25 mg) by mouth daily 60 capsule 1     losartan (COZAAR) 100 MG tablet Take 100 mg by mouth daily       metFORMIN (GLUCOPHAGE) 1000 MG tablet Take 1,000 mg by mouth 2 times daily (with meals)       multivitamin, therapeutic with minerals (THERA-VIT-M) TABS Take 1 tablet by mouth daily       simvastatin (ZOCOR) 20 MG tablet Take 1 tablet (20 mg) by mouth At Bedtime 90 tablet 0     OTC products: Aspirin    Allergies   Allergen Reactions     Aleve [Naproxen] Nausea     Ibuprofen Nausea     Lisinopril Cough     DRY COUGH      Latex Allergy: NO    Social History     Tobacco Use     Smoking status: Former Smoker     Packs/day: 0.00     Years: 35.00     Pack years: 0.00     Types: Cigarettes     Start date: 1968     Quit date: 2003     Years since quittin.3     Smokeless tobacco: Never Used   Substance Use Topics     Alcohol use: Yes     Comment: occasional     History   Drug Use No       REVIEW OF SYSTEMS:   CONSTITUTIONAL: NEGATIVE for fever, chills, change in weight  INTEGUMENTARY/SKIN: NEGATIVE for worrisome rashes, moles or lesions  EYES: NEGATIVE for vision changes or irritation  ENT/MOUTH: NEGATIVE for ear, mouth and throat problems  RESP: NEGATIVE for significant cough or SOB  BREAST: NEGATIVE for masses, tenderness or discharge  CV: NEGATIVE for chest pain, palpitations or peripheral edema  GI: NEGATIVE for nausea, abdominal pain, heartburn, or change in bowel habits  : NEGATIVE for frequency, dysuria, or hematuria  MUSCULOSKELETAL: NEGATIVE for significant arthralgias or myalgia  NEURO: NEGATIVE for weakness, dizziness or paresthesias  ENDOCRINE: NEGATIVE for temperature intolerance, skin/hair changes  HEME: NEGATIVE for bleeding problems  PSYCHIATRIC: NEGATIVE for changes in mood or affect    EXAM:   /74 (BP Location: Right arm, Patient Position: Chair, Cuff Size: Adult Regular)   Pulse 77   Temp  97.6  F (36.4  C) (Oral)   Resp 16   Wt 101.6 kg (224 lb)   SpO2 99%   BMI 37.28 kg/m      GENERAL APPEARANCE: healthy, alert and no distress     EYES: EOMI, PERRL     HENT: ear canals and TM's normal and nose and mouth without ulcers or lesions     NECK: no adenopathy, no asymmetry, masses, or scars and thyroid normal to palpation     RESP: lungs clear to auscultation - no rales, rhonchi or wheezes     CV: regular rates and rhythm, normal S1 S2, no S3 or S4 and no murmur, click or rub     ABDOMEN:  soft, nontender, no HSM or masses and bowel sounds normal     MS: extremities normal- no gross deformities noted, no evidence of inflammation in joints, FROM in all extremities.     SKIN: no suspicious lesions or rashes     NEURO: Normal strength and tone, sensory exam grossly normal, mentation intact and speech normal     PSYCH: mentation appears normal. and affect normal/bright     LYMPHATICS: No cervical adenopathy    DIAGNOSTICS:   No labs or EKG required for low risk surgery (cataract, skin procedure, breast biopsy, etc)    Recent Labs   Lab Test 03/09/20  1419 05/25/16  0643 05/24/16  0625 05/23/16  1330 05/06/16  1134 12/15/14  0000 12/15/14 10/30/14  0828 05/19/14  1045 05/19/14  1045   HGB  --  12.0 12.2  --  13.6  --   --  13.5  --  15.4   PLT  --   --   --   --  366  --   --  401  --  362   NA  --   --   --   --   --   --   --  137  --  138   POTASSIUM  --   --   --  3.9  --   --  4.4 4.2  --  4.6   CR  --  0.71  --  0.73  --    < > 0.73 0.65  --  0.66   A1C 5.9*  --   --   --  6.0   < > 6.1* 6.3*   < > 11.5*    < > = values in this interval not displayed.        IMPRESSION:   Reason for surgery/procedure: Left IOL for treatment of left cataract.     The proposed surgical procedure is considered LOW risk.    REVISED CARDIAC RISK INDEX  The patient has the following serious cardiovascular risks for perioperative complications such as (MI, PE, VFib and 3  AV Block):  No serious cardiac risks  INTERPRETATION:  0 risks: Class I (very low risk - 0.4% complication rate)    The patient has the following additional risks for perioperative complications:  No identified additional risks      ICD-10-CM    1. Preop general physical exam  Z01.818    2. Controlled type 2 diabetes mellitus without complication, without long-term current use of insulin (H)  E11.9 Hemoglobin A1c     Basic metabolic panel  (Ca, Cl, CO2, Creat, Gluc, K, Na, BUN)     CBC with platelets     Albumin Random Urine Quantitative with Creat Ratio     Lipid panel reflex to direct LDL Non-fasting   3. Essential hypertension, benign  I10    4. Morbid obesity (H)  E66.01        RECOMMENDATIONS:     --Consult hospital rounder / IM to assist post-op medical management    --Patient is to take all scheduled medications on the day of surgery EXCEPT for modifications listed below.    APPROVAL GIVEN to proceed with proposed procedure, without further diagnostic evaluation       Hold Aspirin and multivitamin until after surgery.   Hold canaliflozin morning of surgery.   Hold metformin morning of surgery.   Hold hydrochlorothiazide morning of surgery.   Hold losartan morning of surgery.     Signed Electronically by: ELVI Tejeda CNP    Copy of this evaluation report is provided to requesting physician.    [facility] Preop Guidelines    Revised Cardiac Risk Index

## 2020-11-03 LAB
ANION GAP SERPL CALCULATED.3IONS-SCNC: 1 MMOL/L (ref 3–14)
BUN SERPL-MCNC: 20 MG/DL (ref 7–30)
CALCIUM SERPL-MCNC: 10.2 MG/DL (ref 8.5–10.1)
CHLORIDE SERPL-SCNC: 103 MMOL/L (ref 94–109)
CHOLEST SERPL-MCNC: 139 MG/DL
CO2 SERPL-SCNC: 33 MMOL/L (ref 20–32)
CREAT SERPL-MCNC: 1 MG/DL (ref 0.52–1.04)
CREAT UR-MCNC: 55 MG/DL
GFR SERPL CREATININE-BSD FRML MDRD: 57 ML/MIN/{1.73_M2}
GLUCOSE SERPL-MCNC: 92 MG/DL (ref 70–99)
HDLC SERPL-MCNC: 50 MG/DL
LDLC SERPL CALC-MCNC: 47 MG/DL
MICROALBUMIN UR-MCNC: <5 MG/L
MICROALBUMIN/CREAT UR: NORMAL MG/G CR (ref 0–25)
NONHDLC SERPL-MCNC: 89 MG/DL
POTASSIUM SERPL-SCNC: 4.2 MMOL/L (ref 3.4–5.3)
SODIUM SERPL-SCNC: 137 MMOL/L (ref 133–144)
TRIGL SERPL-MCNC: 211 MG/DL

## 2021-01-15 ENCOUNTER — HEALTH MAINTENANCE LETTER (OUTPATIENT)
Age: 71
End: 2021-01-15

## 2021-01-22 ENCOUNTER — OFFICE VISIT (OUTPATIENT)
Dept: FAMILY MEDICINE | Facility: CLINIC | Age: 71
End: 2021-01-22
Payer: COMMERCIAL

## 2021-01-22 VITALS
OXYGEN SATURATION: 97 % | WEIGHT: 218 LBS | SYSTOLIC BLOOD PRESSURE: 136 MMHG | DIASTOLIC BLOOD PRESSURE: 72 MMHG | BODY MASS INDEX: 36.28 KG/M2 | TEMPERATURE: 97.5 F | HEART RATE: 88 BPM

## 2021-01-22 DIAGNOSIS — E66.01 MORBID OBESITY (H): ICD-10-CM

## 2021-01-22 DIAGNOSIS — Z01.818 PREOP GENERAL PHYSICAL EXAM: Primary | ICD-10-CM

## 2021-01-22 DIAGNOSIS — F32.0 MILD MAJOR DEPRESSION (H): ICD-10-CM

## 2021-01-22 DIAGNOSIS — H25.89 OTHER AGE-RELATED CATARACT OF LEFT EYE: ICD-10-CM

## 2021-01-22 DIAGNOSIS — E11.9 CONTROLLED TYPE 2 DIABETES MELLITUS WITHOUT COMPLICATION, WITHOUT LONG-TERM CURRENT USE OF INSULIN (H): ICD-10-CM

## 2021-01-22 PROCEDURE — 99214 OFFICE O/P EST MOD 30 MIN: CPT | Performed by: NURSE PRACTITIONER

## 2021-01-22 RX ORDER — ROSUVASTATIN CALCIUM 10 MG/1
10 TABLET, COATED ORAL DAILY
COMMUNITY
Start: 2021-01-22

## 2021-01-22 ASSESSMENT — PATIENT HEALTH QUESTIONNAIRE - PHQ9: SUM OF ALL RESPONSES TO PHQ QUESTIONS 1-9: 0

## 2021-01-22 NOTE — PROGRESS NOTES
[facility]  [id number] [place] [id number]  Phone: [phone]  Primary Provider: Bibi Orta  Pre-op Performing Provider: EDITH BULL    PREOPERATIVE EVALUATION:  Today's date: 1/22/2021    David Mckeon is a 70 year old female who presents for a preoperative evaluation.    Surgical Information:  Surgery/Procedure: Left IOL placement  Surgery Location: [facility]  Surgeon:  Dr. De La Cruz   Surgery Date: 1/28/21  Time of Surgery: 1100  Where patient plans to recover: At home with family  Fax number for surgical facility: [phone]    Type of Anesthesia Anticipated: Local with MAC    Subjective     HPI related to upcoming procedure: Left IOL placement due to cataract. Was originally scheduled in 11/2020, however, needed to reschedule due to COVID-19 exposure. Patient was without COVID-19 infection.     Preop Questions 1/22/2021   1. Have you ever had a heart attack or stroke? No   2. Have you ever had surgery on your heart or blood vessels, such as a stent placement, a coronary artery bypass, or surgery on an artery in your head, neck, heart, or legs? No   3. Do you have chest pain with activity? No   4. Do you have a history of  heart failure? No   5. Do you currently have a cold, bronchitis or symptoms of other infection? No   6. Do you have a cough, shortness of breath, or wheezing? No   7. Do you or anyone in your family have previous history of blood clots? No   8. Do you or does anyone in your family have a serious bleeding problem such as prolonged bleeding following surgeries or cuts? No   9. Have you ever had problems with anemia or been told to take iron pills? No   10. Have you had any abnormal blood loss such as black, tarry or bloody stools, or abnormal vaginal bleeding? No   11. Have you ever had a blood transfusion? No   12. Are you willing to have a blood transfusion if it is medically needed before, during, or after  your surgery? Yes   13. Have you or any of your relatives ever had problems with anesthesia? No   14. Do you have sleep apnea, excessive snoring or daytime drowsiness? No   15. Do you have any artifical heart valves or other implanted medical devices like a pacemaker, defibrillator, or continuous glucose monitor? No   16. Do you have artificial joints? YES - Right TKA   17. Are you allergic to latex? No   18. Is there any chance that you may be pregnant? -       Health Care Directive:  Patient has a Health Care Directive on file      Preoperative Review of :   reviewed - controlled substances reflected in medication list.      Status of Chronic Conditions:  DEPRESSION - Patient has a long history of Depression of moderate severity requiring medication for control with recent symptoms being stable..Current symptoms of depression include none.     DIABETES - Patient has a longstanding history of DiabetesType Type II . Patient is being treated with oral agents and denies significant side effects. Control has been good. Complicating factors include but are not limited to: hypertension, chronic kidney disease and morbid obesity .     HYPERLIPIDEMIA - Patient has a long history of significant Hyperlipidemia requiring medication for treatment with recent good control. Patient reports no problems or side effects with the medication.     HYPERTENSION - Patient has longstanding history of HTN , currently denies any symptoms referable to elevated blood pressure. Specifically denies chest pain, palpitations, dyspnea, orthopnea, PND or peripheral edema. Blood pressure readings have not been in normal range. Current medication regimen is as listed below. Patient denies any side effects of medication.       Review of Systems  CONSTITUTIONAL: NEGATIVE for fever, chills, change in weight  INTEGUMENTARY/SKIN: NEGATIVE for worrisome rashes, moles or lesions  EYES: NEGATIVE for vision changes or irritation  ENT/MOUTH: NEGATIVE  for ear, mouth and throat problems  RESP: NEGATIVE for significant cough or SOB  BREAST: NEGATIVE for masses, tenderness or discharge  CV: NEGATIVE for chest pain, palpitations or peripheral edema  GI: NEGATIVE for nausea, abdominal pain, heartburn, or change in bowel habits  : NEGATIVE for frequency, dysuria, or hematuria  MUSCULOSKELETAL: NEGATIVE for significant arthralgias or myalgia  NEURO: NEGATIVE for weakness, dizziness or paresthesias  ENDOCRINE: NEGATIVE for temperature intolerance, skin/hair changes  HEME: NEGATIVE for bleeding problems  PSYCHIATRIC: NEGATIVE for changes in mood or affect    Patient Active Problem List    Diagnosis Date Noted     S/P total knee replacement using cement, right 05/23/2016     Priority: Medium     Type 2 diabetes mellitus, controlled (H) 05/06/2016     Priority: Medium     Essential hypertension, benign 05/06/2016     Priority: Medium     Morbid obesity (H) 10/25/2015     Priority: Medium     Diverticulosis of large intestine without hemorrhage 07/28/2015     Priority: Medium     Hemorrhoids 07/28/2015     Priority: Medium     S/P colonoscopy with polypectomy 07/28/2015     Priority: Medium     Mild major depression (H) 06/30/2014     Priority: Medium     Quality         Hyperlipidemia with target LDL less than 100 06/30/2014     Priority: Medium     Diagnosis updated by automated process. Provider to review and confirm.       History of colonic polyps 05/19/2014     Priority: Medium     CARDIOVASCULAR SCREENING; LDL GOAL LESS THAN 130 05/19/2014     Priority: Medium      Past Medical History:   Diagnosis Date     High cholesterol      Hypertension      Mild major depression (H) 6/30/2014     Osteoarthritis     right knee     Type 2 diabetes mellitus (H)      Past Surgical History:   Procedure Laterality Date     ARTHROPLASTY KNEE Right 5/23/2016    Procedure: ARTHROPLASTY KNEE;  Surgeon: Pankaj Schuster MD;  Location: [facility] OR     CARPAL TUNNEL RELEASE RT/LT  Bilateral      COLONOSCOPY  2015     EYE SURGERY      glasses     SOFT TISSUE SURGERY      carpal tunnel-both hands     STAPEDECTOMY Bilateral      Current Outpatient Medications   Medication Sig Dispense Refill     rosuvastatin (CRESTOR) 10 MG tablet Take 1 tablet (10 mg) by mouth daily       ACE NOT PRESCRIBED, INTENTIONAL, 1 each ACE Inhibitor not prescribed due to Allergy 0 each 0     amLODIPine (NORVASC) 10 MG tablet Take 1 tablet (10 mg) by mouth daily 90 tablet 0     aspirin (ASA) 81 MG EC tablet Take 81 mg by mouth daily       buPROPion (WELLBUTRIN XL) 300 MG 24 hr tablet        Calcium Polycarbophil (FIBER-CAPS PO) Take 1,500 mg by mouth 2 times daily 1.5 PILLS IN THE am AND 1 PILL IN THE pm       canaliflozin (INVOKANA) tablet Take 300 mg by mouth every morning (before breakfast)       carvedilol (COREG) 6.25 MG tablet Take 1 tablet (6.25 mg) by mouth 2 times daily (with meals) 60 tablet 1     Cholecalciferol (VITAMIN D3) 2000 UNITS CAPS Take 1 capsule by mouth 2 times daily        docusate sodium (COLACE) 100 MG tablet Take 100 mg by mouth 3 times daily       hydrochlorothiazide (MICROZIDE) 12.5 MG capsule Take 2 capsules (25 mg) by mouth daily 60 capsule 1     losartan (COZAAR) 100 MG tablet Take 100 mg by mouth daily       metFORMIN (GLUCOPHAGE) 1000 MG tablet Take 1,000 mg by mouth 2 times daily (with meals)       multivitamin, therapeutic with minerals (THERA-VIT-M) TABS Take 1 tablet by mouth daily         Allergies   Allergen Reactions     Aleve [Naproxen] Nausea     Ibuprofen Nausea     Lisinopril Cough     DRY COUGH        Social History     Tobacco Use     Smoking status: Former Smoker     Packs/day: 0.00     Years: 35.00     Pack years: 0.00     Types: Cigarettes     Start date: 1968     Quit date: 2003     Years since quittin.6     Smokeless tobacco: Never Used   Substance Use Topics     Alcohol use: Yes     Comment: occasional     Family History   Problem Relation Age of Onset      Sudden Death Mother         MVA     Cerebrovascular Disease Father      Myocardial Infarction Maternal Grandmother      Cancer Maternal Grandfather         Leukemia     Asthma Paternal Grandmother      Myocardial Infarction Paternal Grandfather      Hypertension Brother      Heart Disease Brother         Pacemaker     High cholesterol Brother      Hearing Loss Brother      High cholesterol Sister      Heart Disease Sister         Pacemaker     Blood Disease Sister      High cholesterol Sister      Hypertension Sister      Psoriasis Son      Crohn's Disease Son      Psoriasis Daughter      Cancer Sister         breast cancer, both sisters     Coronary Artery Disease Sister         Congestive heart failure     Hypertension Sister         medication     Breast Cancer Sister         masectomy     Coronary Artery Disease Brother         Congestive heart failure     Hypertension Brother         medication     Hypertension Sister         medication     Breast Cancer Sister         radiation/chemo     Hypertension Other      Hypertension Other      History   Drug Use No         Objective     /72   Pulse 88   Temp 97.5  F (36.4  C) (Oral)   Wt 98.9 kg (218 lb)   SpO2 97%   BMI 36.28 kg/m      Physical Exam    GENERAL APPEARANCE: healthy, alert and no distress     EYES: EOMI, PERRL     HENT: ear canals and TM's normal and nose and mouth without ulcers or lesions     NECK: no adenopathy, no asymmetry, masses, or scars and thyroid normal to palpation     RESP: lungs clear to auscultation - no rales, rhonchi or wheezes     CV: regular rates and rhythm, normal S1 S2, no S3 or S4 and no murmur, click or rub     ABDOMEN:  soft, nontender, no HSM or masses and bowel sounds normal     MS: extremities normal- no gross deformities noted, no evidence of inflammation in joints, FROM in all extremities.     SKIN: no suspicious lesions or rashes     NEURO: Normal strength and tone, sensory exam grossly normal, mentation  intact and speech normal     PSYCH: mentation appears normal. and affect normal/bright     LYMPHATICS: No cervical adenopathy    Recent Labs   Lab Test 11/02/20  1545 03/09/20  1419   HGB 14.9  --      --      --    POTASSIUM 4.2  --    CR 1.00  --    A1C 6.0* 5.9*        Diagnostics:  No labs were ordered during this visit.   No EKG required for low risk surgery (cataract, skin procedure, breast biopsy, etc).    Revised Cardiac Risk Index (RCRI):  The patient has the following serious cardiovascular risks for perioperative complications:   - No serious cardiac risks = 0 points     RCRI Interpretation: 0 points: Class I (very low risk - 0.4% complication rate)           Assessment & Plan   The proposed surgical procedure is considered LOW risk.    David was seen today for pre-op exam.    Diagnoses and all orders for this visit:    Preop general physical exam  Other age-related cataract of left eye  Clearance as below.      Mild major depression (H)  Controlled.     Morbid obesity (H)  Discussed activity and diet guidelines.     Controlled type 2 diabetes mellitus without complication, without long-term current use of insulin (H)  Controlled. Patient declines labs today and would like to update with MEDICARE wellness visit.          Risks and Recommendations:  The patient has the following additional risks and recommendations for perioperative complications:   - No identified additional risk factors other than previously addressed    Medication Instructions:  As below     Hold Aspirin and multivitamin until after surgery.   Hold canaliflozin morning of surgery.   Hold metformin morning of surgery.   Hold hydrochlorothiazide morning of surgery.   Hold losartan morning of surgery.        RECOMMENDATION:  APPROVAL GIVEN to proceed with proposed procedure, without further diagnostic evaluation.    Signed Electronically by: ELVI Tejeda CNP    Copy of this evaluation report is provided to requesting  physician.    Preop [facility] Preop Guidelines    Revised Cardiac Risk Index

## 2021-01-22 NOTE — PATIENT INSTRUCTIONS
Hold Aspirin and multivitamin until after surgery.   Hold canaliflozin morning of surgery.   Hold metformin morning of surgery.   Hold hydrochlorothiazide morning of surgery.   Hold losartan morning of surgery.      Patient Education     Presurgery Checklist  You are scheduled to have surgery. The healthcare staff will try to make your stay comfortable. Use the guidelines below to remind yourself what to do before surgery. Be sure to follow any specific pre-op instructions from your surgeon or nurse.  Preparing for surgery    If you are having abdominal surgery, ask what you need to do to clear your bowel.    Tell your surgeon if you have allergies to any medicines, latex, or foods.    Ask your surgeon if you might need a blood transfusion during surgery and if so, how to prepare for it. In some cases, you can donate blood before surgery. If needed, this blood can be given back (transfused) to you during or after surgery.    Arrange for an adult family member or friend to drive you home after surgery. If possible, have someone ready to help you at home as you recover.    Call the surgeon if you get a cold, fever, sore throat, diarrhea, or other health problem just before surgery. Your surgeon can decide whether or not to postpone the surgery.  Medicines    Tell your surgeon about all medicines you take, including prescription and over-the-counter products such as herbal remedies and vitamins. Ask if you should continue taking them.    If you take ibuprofen, naproxen, or blood thinners (anticoagulants) such as aspirin, clopidogrel, or warfarin, ask your surgeon whether you should stop taking them and how long before surgery you should stop.    You may be told to take antibiotics just before surgery to prevent infection. If so, follow instructions carefully on how to take them.    If you are told to take blood thinners to help prevent blood clots after surgery, be sure to follow the instructions on how to take  them.  Stop smoking  If you smoke, healing may take longer. So at least 2 week(s) before surgery, stop smoking.  Bathing or showering before surgery    If instructed, wash with antibacterial soap. Afterward, do not use lotions, oils, or powders.    If you are having surgery on the head, you may be asked to shampoo with antibacterial soap. Follow instructions for doing so.  Do not remove hair from the surgery site  Do not shave hair from the incision site, unless you are given specific instructions to do so. Usually, if hair needs to be removed, it will be done at the hospital right before surgery.  Don t eat or drink    Follow any directions you are given for not eating or drinking before surgery. If you don't follow instructions about when to stop eating and drinking, your procedure may be postponed or rescheduled for another day. This is a safety issue.    You can brush your teeth and rinse your mouth, but don t swallow any water.  Day of surgery    Don't wear makeup. Don't use perfume, deodorant, or hairspray. Remove nail polish and artificial nails.    Leave jewelry (including rings), watches, and other valuables at home.    Be sure to bring health insurance cards or forms and a photo ID.    Bring a list of your medicines (include the name, dose, how often you take them, and the time last dose was taken).    Arrive on time at the hospital or surgery facility.  Date Last Reviewed: 12/1/2016 2000-2018 [employer]. [address] [place] [address], [place] [address]. All rights reserved. This information is not intended as a substitute for professional medical care. Always follow your healthcare professional's instructions.

## 2021-03-05 ENCOUNTER — IMMUNIZATION (OUTPATIENT)
Dept: NURSING | Facility: CLINIC | Age: 71
End: 2021-03-05
Payer: COMMERCIAL

## 2021-03-05 PROCEDURE — 91301 PR COVID VAC MODERNA 100 MCG/0.5 ML IM: CPT

## 2021-03-05 PROCEDURE — 0011A PR COVID VAC MODERNA 100 MCG/0.5 ML IM: CPT

## 2021-03-20 ENCOUNTER — HEALTH MAINTENANCE LETTER (OUTPATIENT)
Age: 71
End: 2021-03-20

## 2021-04-02 ENCOUNTER — IMMUNIZATION (OUTPATIENT)
Dept: NURSING | Facility: CLINIC | Age: 71
End: 2021-04-02
Attending: INTERNAL MEDICINE
Payer: COMMERCIAL

## 2021-04-02 PROCEDURE — 91301 PR COVID VAC MODERNA 100 MCG/0.5 ML IM: CPT

## 2021-04-02 PROCEDURE — 0012A PR COVID VAC MODERNA 100 MCG/0.5 ML IM: CPT

## 2021-04-09 ENCOUNTER — TELEPHONE (OUTPATIENT)
Dept: FAMILY MEDICINE | Facility: CLINIC | Age: 71
End: 2021-04-09

## 2021-04-09 NOTE — TELEPHONE ENCOUNTER
Summary:    Patient is due/failing the following:   Eye exam    Reviewed:  [x] CARE EVERYWHERE  [x] LAST OV NOTE INCLUDING ENDO  [x] FYI TAB  [x] MYCHART ACTIVE?  [x] LAST PANEL ENCOUNTER  [x] FUTURE APPTS  [x] IMMUNIZATIONS          Action needed:   Patient needs office visit for eye exam.    Type of outreach:    Sent [employer] message.                                                                               Lisa Nesbitt/[facility]---[facility]

## 2021-05-03 ENCOUNTER — TELEPHONE (OUTPATIENT)
Dept: FAMILY MEDICINE | Facility: CLINIC | Age: 71
End: 2021-05-03

## 2021-05-03 NOTE — TELEPHONE ENCOUNTER
Summary:    Patient is due/failing the following:   Eye exam    Reviewed:  [x] CARE EVERYWHERE  [x] LAST OV NOTE INCLUDING ENDO  [x] FYI TAB  [x] MYCHART ACTIVE?  [x] LAST PANEL ENCOUNTER  [x] FUTURE APPTS  [x] IMMUNIZATIONS          Action needed:   Patient needs office visit for eye exam.    Type of outreach:    Phone, spoke to patient.  she states had recent at [facility] [phone]   Talked with clinic and they state will fax report over                                                                                 Lisa Nesbitt/[facility]---[facility]

## 2021-05-03 NOTE — TELEPHONE ENCOUNTER
Received report and will have PCP sign off on retinopathy status    Lisa Nesbitt/[facility]  [place]---[facility]

## 2021-05-15 ENCOUNTER — HEALTH MAINTENANCE LETTER (OUTPATIENT)
Age: 71
End: 2021-05-15

## 2021-05-20 NOTE — TELEPHONE ENCOUNTER
Pt currently passing eye exam per quality    Lisa Nesbitt/[facility]  [place]---[facility]

## 2021-07-29 ENCOUNTER — TELEPHONE (OUTPATIENT)
Dept: FAMILY MEDICINE | Facility: CLINIC | Age: 71
End: 2021-07-29

## 2021-07-29 NOTE — TELEPHONE ENCOUNTER
Patient Quality Outreach      Summary:    Patient has the following on her problem list/HM: None    Patient is due/failing the following:   Breast Cancer Screening - Mammogram    Type of outreach:    Phone, left message for patient/parent to call back.    Questions for provider review:    None                                                                                                                                     Sally Ramachandran [facility]       Chart routed to none.

## 2021-09-04 ENCOUNTER — HEALTH MAINTENANCE LETTER (OUTPATIENT)
Age: 71
End: 2021-09-04

## 2021-12-25 ENCOUNTER — HEALTH MAINTENANCE LETTER (OUTPATIENT)
Age: 71
End: 2021-12-25

## 2022-02-19 ENCOUNTER — HEALTH MAINTENANCE LETTER (OUTPATIENT)
Age: 72
End: 2022-02-19

## 2022-03-21 ENCOUNTER — NURSE TRIAGE (OUTPATIENT)
Dept: FAMILY MEDICINE | Facility: CLINIC | Age: 72
End: 2022-03-21
Payer: COMMERCIAL

## 2022-03-21 NOTE — TELEPHONE ENCOUNTER
Patient Quality Outreach    Patient is due for the following:   Physical  - Due after anytime    NEXT STEPS:   Schedule a yearly physical    Type of outreach:    Sent [email or web address] message.      Questions for provider review:    None     Josefa Hanley, CMA

## 2022-08-06 ENCOUNTER — HEALTH MAINTENANCE LETTER (OUTPATIENT)
Age: 72
End: 2022-08-06

## 2022-10-22 ENCOUNTER — HEALTH MAINTENANCE LETTER (OUTPATIENT)
Age: 72
End: 2022-10-22

## 2023-02-17 SDOH — ECONOMIC STABILITY: TRANSPORTATION INSECURITY
IN THE PAST 12 MONTHS, HAS THE LACK OF TRANSPORTATION KEPT YOU FROM MEDICAL APPOINTMENTS OR FROM GETTING MEDICATIONS?: NO

## 2023-02-17 SDOH — ECONOMIC STABILITY: TRANSPORTATION INSECURITY
IN THE PAST 12 MONTHS, HAS LACK OF TRANSPORTATION KEPT YOU FROM MEETINGS, WORK, OR FROM GETTING THINGS NEEDED FOR DAILY LIVING?: NO

## 2023-02-17 SDOH — ECONOMIC STABILITY: INCOME INSECURITY: IN THE LAST 12 MONTHS, WAS THERE A TIME WHEN YOU WERE NOT ABLE TO PAY THE MORTGAGE OR RENT ON TIME?: NO

## 2023-02-17 SDOH — ECONOMIC STABILITY: FOOD INSECURITY: WITHIN THE PAST 12 MONTHS, THE FOOD YOU BOUGHT JUST DIDN'T LAST AND YOU DIDN'T HAVE MONEY TO GET MORE.: NEVER TRUE

## 2023-02-17 SDOH — HEALTH STABILITY: PHYSICAL HEALTH: ON AVERAGE, HOW MANY DAYS PER WEEK DO YOU ENGAGE IN MODERATE TO STRENUOUS EXERCISE (LIKE A BRISK WALK)?: 2 DAYS

## 2023-02-17 SDOH — ECONOMIC STABILITY: FOOD INSECURITY: WITHIN THE PAST 12 MONTHS, YOU WORRIED THAT YOUR FOOD WOULD RUN OUT BEFORE YOU GOT MONEY TO BUY MORE.: NEVER TRUE

## 2023-02-17 SDOH — ECONOMIC STABILITY: INCOME INSECURITY: HOW HARD IS IT FOR YOU TO PAY FOR THE VERY BASICS LIKE FOOD, HOUSING, MEDICAL CARE, AND HEATING?: NOT HARD AT ALL

## 2023-02-17 SDOH — HEALTH STABILITY: PHYSICAL HEALTH: ON AVERAGE, HOW MANY MINUTES DO YOU ENGAGE IN EXERCISE AT THIS LEVEL?: 20 MIN

## 2023-02-17 ASSESSMENT — SOCIAL DETERMINANTS OF HEALTH (SDOH)
IN A TYPICAL WEEK, HOW MANY TIMES DO YOU TALK ON THE PHONE WITH FAMILY, FRIENDS, OR NEIGHBORS?: TWICE A WEEK
HOW OFTEN DO YOU GET TOGETHER WITH FRIENDS OR RELATIVES?: ONCE A WEEK
DO YOU BELONG TO ANY CLUBS OR ORGANIZATIONS SUCH AS CHURCH GROUPS UNIONS, FRATERNAL OR ATHLETIC GROUPS, OR SCHOOL GROUPS?: NO
HOW OFTEN DO YOU ATTEND CHURCH OR RELIGIOUS SERVICES?: NEVER

## 2023-02-17 ASSESSMENT — LIFESTYLE VARIABLES
HOW OFTEN DO YOU HAVE SIX OR MORE DRINKS ON ONE OCCASION: NEVER
HOW OFTEN DO YOU HAVE A DRINK CONTAINING ALCOHOL: 4 OR MORE TIMES A WEEK
HOW MANY STANDARD DRINKS CONTAINING ALCOHOL DO YOU HAVE ON A TYPICAL DAY: 1 OR 2
AUDIT-C TOTAL SCORE: 4
SKIP TO QUESTIONS 9-10: 1

## 2023-02-22 ENCOUNTER — OFFICE VISIT (OUTPATIENT)
Dept: FAMILY MEDICINE | Facility: CLINIC | Age: 73
End: 2023-02-22
Payer: COMMERCIAL

## 2023-02-22 VITALS
WEIGHT: 211.4 LBS | HEART RATE: 80 BPM | OXYGEN SATURATION: 97 % | DIASTOLIC BLOOD PRESSURE: 70 MMHG | TEMPERATURE: 98.1 F | BODY MASS INDEX: 35.22 KG/M2 | RESPIRATION RATE: 12 BRPM | HEIGHT: 65 IN | SYSTOLIC BLOOD PRESSURE: 138 MMHG

## 2023-02-22 DIAGNOSIS — Z01.818 PREOP GENERAL PHYSICAL EXAM: Primary | ICD-10-CM

## 2023-02-22 DIAGNOSIS — E11.22 TYPE 2 DIABETES MELLITUS WITH STAGE 3A CHRONIC KIDNEY DISEASE, WITHOUT LONG-TERM CURRENT USE OF INSULIN (H): ICD-10-CM

## 2023-02-22 DIAGNOSIS — E66.01 MORBID OBESITY (H): ICD-10-CM

## 2023-02-22 DIAGNOSIS — N18.31 TYPE 2 DIABETES MELLITUS WITH STAGE 3A CHRONIC KIDNEY DISEASE, WITHOUT LONG-TERM CURRENT USE OF INSULIN (H): ICD-10-CM

## 2023-02-22 DIAGNOSIS — I10 ESSENTIAL HYPERTENSION, BENIGN: ICD-10-CM

## 2023-02-22 DIAGNOSIS — N18.31 STAGE 3A CHRONIC KIDNEY DISEASE (H): ICD-10-CM

## 2023-02-22 DIAGNOSIS — Z13.6 CARDIOVASCULAR SCREENING; LDL GOAL LESS THAN 130: ICD-10-CM

## 2023-02-22 LAB
ANION GAP SERPL CALCULATED.3IONS-SCNC: 16 MMOL/L (ref 7–15)
BASOPHILS # BLD AUTO: 0.1 10E3/UL (ref 0–0.2)
BASOPHILS NFR BLD AUTO: 1 %
BUN SERPL-MCNC: 20.7 MG/DL (ref 8–23)
CALCIUM SERPL-MCNC: 10 MG/DL (ref 8.8–10.2)
CHLORIDE SERPL-SCNC: 101 MMOL/L (ref 98–107)
CHOLEST SERPL-MCNC: 172 MG/DL
CREAT SERPL-MCNC: 1.03 MG/DL (ref 0.51–0.95)
CREAT UR-MCNC: 101 MG/DL
DEPRECATED HCO3 PLAS-SCNC: 24 MMOL/L (ref 22–29)
EOSINOPHIL # BLD AUTO: 0.4 10E3/UL (ref 0–0.7)
EOSINOPHIL NFR BLD AUTO: 4 %
ERYTHROCYTE [DISTWIDTH] IN BLOOD BY AUTOMATED COUNT: 14.2 % (ref 10–15)
GFR SERPL CREATININE-BSD FRML MDRD: 57 ML/MIN/1.73M2
GLUCOSE SERPL-MCNC: 115 MG/DL (ref 70–99)
HBA1C MFR BLD: 6.2 % (ref 0–5.6)
HCT VFR BLD AUTO: 45.8 % (ref 35–47)
HDLC SERPL-MCNC: 76 MG/DL
HGB BLD-MCNC: 14.9 G/DL (ref 11.7–15.7)
LDLC SERPL CALC-MCNC: 68 MG/DL
LYMPHOCYTES # BLD AUTO: 2.6 10E3/UL (ref 0.8–5.3)
LYMPHOCYTES NFR BLD AUTO: 26 %
MCH RBC QN AUTO: 29.5 PG (ref 26.5–33)
MCHC RBC AUTO-ENTMCNC: 32.5 G/DL (ref 31.5–36.5)
MCV RBC AUTO: 91 FL (ref 78–100)
MICROALBUMIN UR-MCNC: <12 MG/L
MICROALBUMIN/CREAT UR: NORMAL MG/G{CREAT}
MONOCYTES # BLD AUTO: 1 10E3/UL (ref 0–1.3)
MONOCYTES NFR BLD AUTO: 10 %
NEUTROPHILS # BLD AUTO: 5.9 10E3/UL (ref 1.6–8.3)
NEUTROPHILS NFR BLD AUTO: 59 %
NONHDLC SERPL-MCNC: 96 MG/DL
PLATELET # BLD AUTO: 419 10E3/UL (ref 150–450)
POTASSIUM SERPL-SCNC: 4.5 MMOL/L (ref 3.4–5.3)
RBC # BLD AUTO: 5.05 10E6/UL (ref 3.8–5.2)
SODIUM SERPL-SCNC: 141 MMOL/L (ref 136–145)
TRIGL SERPL-MCNC: 142 MG/DL
WBC # BLD AUTO: 10 10E3/UL (ref 4–11)

## 2023-02-22 PROCEDURE — 99214 OFFICE O/P EST MOD 30 MIN: CPT | Performed by: FAMILY MEDICINE

## 2023-02-22 PROCEDURE — 82570 ASSAY OF URINE CREATININE: CPT | Performed by: FAMILY MEDICINE

## 2023-02-22 PROCEDURE — 85025 COMPLETE CBC W/AUTO DIFF WBC: CPT | Performed by: FAMILY MEDICINE

## 2023-02-22 PROCEDURE — 93000 ELECTROCARDIOGRAM COMPLETE: CPT | Performed by: FAMILY MEDICINE

## 2023-02-22 PROCEDURE — 80061 LIPID PANEL: CPT | Performed by: FAMILY MEDICINE

## 2023-02-22 PROCEDURE — 36415 COLL VENOUS BLD VENIPUNCTURE: CPT | Performed by: FAMILY MEDICINE

## 2023-02-22 PROCEDURE — 82043 UR ALBUMIN QUANTITATIVE: CPT | Performed by: FAMILY MEDICINE

## 2023-02-22 PROCEDURE — 80048 BASIC METABOLIC PNL TOTAL CA: CPT | Performed by: FAMILY MEDICINE

## 2023-02-22 PROCEDURE — 83036 HEMOGLOBIN GLYCOSYLATED A1C: CPT | Performed by: FAMILY MEDICINE

## 2023-02-22 RX ORDER — PHENTERMINE HYDROCHLORIDE 37.5 MG/1
37.5 TABLET ORAL
COMMUNITY

## 2023-02-22 SDOH — HEALTH STABILITY: PHYSICAL HEALTH: ON AVERAGE, HOW MANY DAYS PER WEEK DO YOU ENGAGE IN MODERATE TO STRENUOUS EXERCISE (LIKE A BRISK WALK)?: 2 DAYS

## 2023-02-22 SDOH — ECONOMIC STABILITY: FOOD INSECURITY: WITHIN THE PAST 12 MONTHS, YOU WORRIED THAT YOUR FOOD WOULD RUN OUT BEFORE YOU GOT MONEY TO BUY MORE.: NEVER TRUE

## 2023-02-22 SDOH — ECONOMIC STABILITY: FOOD INSECURITY: WITHIN THE PAST 12 MONTHS, THE FOOD YOU BOUGHT JUST DIDN'T LAST AND YOU DIDN'T HAVE MONEY TO GET MORE.: NEVER TRUE

## 2023-02-22 SDOH — ECONOMIC STABILITY: INCOME INSECURITY: HOW HARD IS IT FOR YOU TO PAY FOR THE VERY BASICS LIKE FOOD, HOUSING, MEDICAL CARE, AND HEATING?: NOT HARD AT ALL

## 2023-02-22 SDOH — HEALTH STABILITY: PHYSICAL HEALTH: ON AVERAGE, HOW MANY MINUTES DO YOU ENGAGE IN EXERCISE AT THIS LEVEL?: 20 MIN

## 2023-02-22 SDOH — ECONOMIC STABILITY: INCOME INSECURITY: IN THE LAST 12 MONTHS, WAS THERE A TIME WHEN YOU WERE NOT ABLE TO PAY THE MORTGAGE OR RENT ON TIME?: NO

## 2023-02-22 ASSESSMENT — ANXIETY QUESTIONNAIRES
7. FEELING AFRAID AS IF SOMETHING AWFUL MIGHT HAPPEN: NOT AT ALL
GAD7 TOTAL SCORE: 0
GAD7 TOTAL SCORE: 0
IF YOU CHECKED OFF ANY PROBLEMS ON THIS QUESTIONNAIRE, HOW DIFFICULT HAVE THESE PROBLEMS MADE IT FOR YOU TO DO YOUR WORK, TAKE CARE OF THINGS AT HOME, OR GET ALONG WITH OTHER PEOPLE: NOT DIFFICULT AT ALL
3. WORRYING TOO MUCH ABOUT DIFFERENT THINGS: NOT AT ALL
GAD7 TOTAL SCORE: 0
8. IF YOU CHECKED OFF ANY PROBLEMS, HOW DIFFICULT HAVE THESE MADE IT FOR YOU TO DO YOUR WORK, TAKE CARE OF THINGS AT HOME, OR GET ALONG WITH OTHER PEOPLE?: NOT DIFFICULT AT ALL
1. FEELING NERVOUS, ANXIOUS, OR ON EDGE: NOT AT ALL
2. NOT BEING ABLE TO STOP OR CONTROL WORRYING: NOT AT ALL
5. BEING SO RESTLESS THAT IT IS HARD TO SIT STILL: NOT AT ALL
4. TROUBLE RELAXING: NOT AT ALL
6. BECOMING EASILY ANNOYED OR IRRITABLE: NOT AT ALL
7. FEELING AFRAID AS IF SOMETHING AWFUL MIGHT HAPPEN: NOT AT ALL

## 2023-02-22 ASSESSMENT — LIFESTYLE VARIABLES
HOW OFTEN DO YOU HAVE SIX OR MORE DRINKS ON ONE OCCASION: NEVER
HOW OFTEN DO YOU HAVE A DRINK CONTAINING ALCOHOL: 4 OR MORE TIMES A WEEK
HOW MANY STANDARD DRINKS CONTAINING ALCOHOL DO YOU HAVE ON A TYPICAL DAY: 1 OR 2
SKIP TO QUESTIONS 9-10: 1
AUDIT-C TOTAL SCORE: 4

## 2023-02-22 ASSESSMENT — SOCIAL DETERMINANTS OF HEALTH (SDOH)
HOW OFTEN DO YOU GET TOGETHER WITH FRIENDS OR RELATIVES?: ONCE A WEEK
HOW OFTEN DO YOU ATTEND CHURCH OR RELIGIOUS SERVICES?: NEVER
DO YOU BELONG TO ANY CLUBS OR ORGANIZATIONS SUCH AS CHURCH GROUPS UNIONS, FRATERNAL OR ATHLETIC GROUPS, OR SCHOOL GROUPS?: NO
IN A TYPICAL WEEK, HOW MANY TIMES DO YOU TALK ON THE PHONE WITH FAMILY, FRIENDS, OR NEIGHBORS?: TWICE A WEEK

## 2023-02-22 ASSESSMENT — PATIENT HEALTH QUESTIONNAIRE - PHQ9
SUM OF ALL RESPONSES TO PHQ QUESTIONS 1-9: 0
10. IF YOU CHECKED OFF ANY PROBLEMS, HOW DIFFICULT HAVE THESE PROBLEMS MADE IT FOR YOU TO DO YOUR WORK, TAKE CARE OF THINGS AT HOME, OR GET ALONG WITH OTHER PEOPLE: NOT DIFFICULT AT ALL
SUM OF ALL RESPONSES TO PHQ QUESTIONS 1-9: 0

## 2023-02-22 NOTE — ASSESSMENT & PLAN NOTE
Hemoglobin A1C   Date Value Ref Range Status   02/22/2023 6.2 (H) 0.0 - 5.6 % Final     Comment:     Normal <5.7%   Prediabetes 5.7-6.4%    Diabetes 6.5% or higher     Note: Adopted from ADA consensus guidelines.   11/02/2020 6.0 (H) 0 - 5.6 % Final     Comment:     Normal <5.7% Prediabetes 5.7-6.4%  Diabetes 6.5% or higher - adopted from ADA   consensus guidelines.

## 2023-02-22 NOTE — ASSESSMENT & PLAN NOTE
GFR Estimate   Date Value Ref Range Status   11/02/2020 57 (L) >60 mL/min/[1.73_m2] Final     Comment:     Non  GFR Calc  Starting 12/18/2018, serum creatinine based estimated GFR (eGFR) will be   calculated using the Chronic Kidney Disease Epidemiology Collaboration   (CKD-EPI) equation.

## 2023-02-22 NOTE — PATIENT INSTRUCTIONS
No metformin, Hydrochlorothiazide , Phentermine AM of surgery  No Jardiance 3 days before surgery  No vitamins 2 weeks before surgery

## 2023-02-23 NOTE — RESULT ENCOUNTER NOTE
Diabetes is stable, kidney function is almost completely normal, stable 2.  Other tests look good  Harrison Briones MD

## 2023-04-01 ENCOUNTER — HEALTH MAINTENANCE LETTER (OUTPATIENT)
Age: 73
End: 2023-04-01

## 2023-08-26 ENCOUNTER — HEALTH MAINTENANCE LETTER (OUTPATIENT)
Age: 73
End: 2023-08-26

## 2024-02-23 NOTE — PROGRESS NOTES
[facility]  [address] [place] [id number]  Phone: [phone]  Primary Provider: Bibi Orta  Pre-op Performing Provider: TORO AKINS      PREOPERATIVE EVALUATION:  Today's date: 2/22/2023    David Mckeon is a 72 year old female who presents for a preoperative evaluation.    Surgical Information:  Surgery/Procedure: Joint replaced in right thumb  [facility] ( patient will call us to confirm this is the correct location)  Surgeon: Dr. Hernadez  Surgery Date: 2/24/2023  Time of Surgery: 5:30am  Where patient plans to recover: At home with family  Fax number for surgical facility: Patient will call us with fax number    Type of Anesthesia Anticipated: to be determined    Assessment & Plan     The proposed surgical procedure is considered INTERMEDIATE risk.    Problem List Items Addressed This Visit     CARDIOVASCULAR SCREENING; LDL GOAL LESS THAN 130    Essential hypertension, benign     Controlled         Relevant Orders    BASIC METABOLIC PANEL    Morbid obesity (H)     Endocrinology manages         Relevant Medications    empagliflozin (JARDIANCE) 25 MG TABS tablet    phentermine (ADIPEX-P) 37.5 MG tablet    Stage 3a chronic kidney disease (H)     GFR Estimate   Date Value Ref Range Status   11/02/2020 57 (L) >60 mL/min/[1.73_m2] Final     Comment:     Non  GFR Calc  Starting 12/18/2018, serum creatinine based estimated GFR (eGFR) will be   calculated using the Chronic Kidney Disease Epidemiology Collaboration   (CKD-EPI) equation.                Type 2 diabetes mellitus with stage 3a chronic kidney disease, without long-term current use of insulin (H)     Hemoglobin A1C   Date Value Ref Range Status   02/22/2023 6.2 (H) 0.0 - 5.6 % Final     Comment:     Normal <5.7%   Prediabetes 5.7-6.4%    Diabetes 6.5% or higher     Note: Adopted from ADA consensus guidelines.   11/02/2020 6.0 (H) 0 - 5.6 %  Final     Comment:     Normal <5.7% Prediabetes 5.7-6.4%  Diabetes 6.5% or higher - adopted from ADA   consensus guidelines.                Relevant Medications    empagliflozin (JARDIANCE) 25 MG TABS tablet    Other Relevant Orders    HEMOGLOBIN A1C (Completed)    Lipid panel reflex to direct LDL Non-fasting    Albumin Random Urine Quantitative with Creat Ratio   Other Visit Diagnoses     Preop general physical exam    -  Primary    Relevant Orders    HEMOGLOBIN A1C (Completed)    BASIC METABOLIC PANEL    EKG 12-lead complete w/read - Clinics (Completed)    CBC with platelets and differential (Completed)                   Pt will hold  metformin, Hydrochlorothiazide , Phentermine AM of surgery  No Jardiance 3 days before surgery  No vitamins 2 weeks before surgery  She will take other medications    RECOMMENDATION:  APPROVAL GIVEN to proceed with proposed procedure, without further diagnostic evaluation.            Subjective     HPI related to upcoming procedure: OA thumb    Preop Questions 2/22/2023   1. Have you ever had a heart attack or stroke? No   2. Have you ever had surgery on your heart or blood vessels, such as a stent placement, a coronary artery bypass, or surgery on an artery in your head, neck, heart, or legs? No   3. Do you have chest pain with activity? No   4. Do you have a history of  heart failure? No   5. Do you currently have a cold, bronchitis or symptoms of other infection? No   6. Do you have a cough, shortness of breath, or wheezing? No   7. Do you or anyone in your family have previous history of blood clots? No   8. Do you or does anyone in your family have a serious bleeding problem such as prolonged bleeding following surgeries or cuts? No   9. Have you ever had problems with anemia or been told to take iron pills? No   10. Have you had any abnormal blood loss such as black, tarry or bloody stools, or abnormal vaginal bleeding? No   11. Have you ever had a blood transfusion? No   12.  Are you willing to have a blood transfusion if it is medically needed before, during, or after your surgery? Yes   13. Have you or any of your relatives ever had problems with anesthesia? No   14. Do you have sleep apnea, excessive snoring or daytime drowsiness? No   15. Do you have any artifical heart valves or other implanted medical devices like a pacemaker, defibrillator, or continuous glucose monitor? No   16. Do you have artificial joints? YES -    17. Are you allergic to latex? No   18. Is there any chance that you may be pregnant? -       Health Care Directive:  Patient has a Health Care Directive on file      Preoperative Review of :   reviewed - no record of controlled substances prescribed.          Review of Systems  CONSTITUTIONAL: NEGATIVE for fever, chills, change in weight  ENT/MOUTH: NEGATIVE for ear, mouth and throat problems  RESP: NEGATIVE for significant cough or SOB  CV: NEGATIVE for chest pain, palpitations or peripheral edema  HEME/ALLERGY/IMMUNE: NEGATIVE for bleeding problems    Patient Active Problem List    Diagnosis Date Noted     Stage 3a chronic kidney disease (H) 02/22/2023     Priority: Medium     S/P total knee replacement using cement, right 05/23/2016     Priority: Medium     Type 2 diabetes mellitus with stage 3a chronic kidney disease, without long-term current use of insulin (H) 05/06/2016     Priority: Medium     Essential hypertension, benign 05/06/2016     Priority: Medium     Morbid obesity (H) 10/25/2015     Priority: Medium     Diverticulosis of large intestine without hemorrhage 07/28/2015     Priority: Medium     Hemorrhoids 07/28/2015     Priority: Medium     S/P colonoscopy with polypectomy 07/28/2015     Priority: Medium     Mild major depression (H) 06/30/2014     Priority: Medium     Quality         Hyperlipidemia with target LDL less than 100 06/30/2014     Priority: Medium     Diagnosis updated by automated process. Provider to review and confirm.        History of colonic polyps 05/19/2014     Priority: Medium     CARDIOVASCULAR SCREENING; LDL GOAL LESS THAN 130 05/19/2014     Priority: Medium      Past Medical History:   Diagnosis Date     High cholesterol      Hypertension      Mild major depression (H) 6/30/2014     Osteoarthritis     right knee     Stage 3a chronic kidney disease (H) 2/22/2023     Type 2 diabetes mellitus (H)      Past Surgical History:   Procedure Laterality Date     ARTHROPLASTY KNEE Right 5/23/2016    Procedure: ARTHROPLASTY KNEE;  Surgeon: Pankaj Schuster MD;  Location: [facility] OR     CARPAL TUNNEL RELEASE RT/LT Bilateral      COLONOSCOPY  6/2015     EYE SURGERY      glasses     SOFT TISSUE SURGERY      carpal tunnel-both hands     STAPEDECTOMY Bilateral      Current Outpatient Medications   Medication Sig Dispense Refill     amLODIPine (NORVASC) 10 MG tablet Take 1 tablet (10 mg) by mouth daily 90 tablet 0     aspirin (ASA) 81 MG EC tablet Take 81 mg by mouth daily       buPROPion (WELLBUTRIN XL) 300 MG 24 hr tablet        Calcium Polycarbophil (FIBER-CAPS PO) Take 1,500 mg by mouth 2 times daily 1.5 PILLS IN THE am AND 1 PILL IN THE pm       Cholecalciferol (VITAMIN D3) 2000 UNITS CAPS Take 1 capsule by mouth 2 times daily        empagliflozin (JARDIANCE) 25 MG TABS tablet        hydrochlorothiazide (MICROZIDE) 12.5 MG capsule Take 2 capsules (25 mg) by mouth daily 60 capsule 1     losartan (COZAAR) 100 MG tablet Take 100 mg by mouth daily       metFORMIN (GLUCOPHAGE) 1000 MG tablet Take 1,000 mg by mouth 2 times daily (with meals)       multivitamin, therapeutic with minerals (THERA-VIT-M) TABS Take 1 tablet by mouth daily       phentermine (ADIPEX-P) 37.5 MG tablet Take 37.5 mg by mouth every morning (before breakfast)       rosuvastatin (CRESTOR) 10 MG tablet Take 1 tablet (10 mg) by mouth daily         Allergies   Allergen Reactions     Aleve [Naproxen] Nausea     Ibuprofen Nausea     Lisinopril Cough     DRY COUGH        Social  "History     Tobacco Use     Smoking status: Former     Packs/day: 0.00     Years: 35.00     Pack years: 0.00     Types: Cigarettes     Start date: 1968     Quit date: 2003     Years since quittin.6     Smokeless tobacco: Never   Substance Use Topics     Alcohol use: Yes     Comment: occasional       History   Drug Use No         Objective     /70 (BP Location: Right arm, Patient Position: Sitting, Cuff Size: Adult Large)   Pulse 80   Temp 98.1  F (36.7  C) (Oral)   Resp 12   Ht 1.651 m (5' 5\")   Wt 95.9 kg (211 lb 6.4 oz)   SpO2 97%   BMI 35.18 kg/m      Physical Exam  Constitutional:       Appearance: Normal appearance. She is obese.   HENT:      Head: Atraumatic.      Ears:      Comments: Hearing aids in place     Nose: Nose normal.      Mouth/Throat:      Mouth: Mucous membranes are moist.   Eyes:      Pupils: Pupils are equal, round, and reactive to light.   Cardiovascular:      Rate and Rhythm: Normal rate and regular rhythm.      Heart sounds: Normal heart sounds.   Pulmonary:      Effort: Pulmonary effort is normal.      Breath sounds: Normal breath sounds.   Abdominal:      General: Bowel sounds are normal.      Palpations: Abdomen is soft.   Musculoskeletal:      Cervical back: Neck supple.   Skin:     General: Skin is warm and dry.   Neurological:      General: No focal deficit present.      Mental Status: She is alert.   Psychiatric:         Mood and Affect: Mood normal.           No results for input(s): HGB, PLT, INR, NA, POTASSIUM, CR, A1C in the last 30159 hours.     Diagnostics:  Labs pending at this time.  Results will be reviewed when available.   EKG: appears normal, NSR, normal axis, normal intervals, no acute ST/T changes c/w ischemia, no LVH by voltage criteria, biatrial enlargement was but L atrial enlargement previously    Revised Cardiac Risk Index (RCRI):  The patient has the following serious cardiovascular risks for perioperative complications:   - No serious " cardiac risks = 0 points     RCRI Interpretation: 0 points: Class I (very low risk - 0.4% complication rate)           Signed Electronically by: Harrison Briones MD  Copy of this evaluation report is provided to requesting physician.       Yes

## 2024-03-23 ENCOUNTER — HEALTH MAINTENANCE LETTER (OUTPATIENT)
Age: 74
End: 2024-03-23

## 2024-03-28 ENCOUNTER — VIRTUAL VISIT (OUTPATIENT)
Dept: UROLOGY | Facility: CLINIC | Age: 74
End: 2024-03-28
Payer: COMMERCIAL

## 2024-03-28 ENCOUNTER — TELEPHONE (OUTPATIENT)
Dept: UROLOGY | Facility: CLINIC | Age: 74
End: 2024-03-28

## 2024-03-28 DIAGNOSIS — R39.15 URINARY URGENCY: Primary | ICD-10-CM

## 2024-03-28 DIAGNOSIS — R39.14 FEELING OF INCOMPLETE BLADDER EMPTYING: ICD-10-CM

## 2024-03-28 DIAGNOSIS — K62.3 PARTIAL RECTAL PROLAPSE: ICD-10-CM

## 2024-03-28 PROCEDURE — 99203 OFFICE O/P NEW LOW 30 MIN: CPT | Mod: 95 | Performed by: PHYSICIAN ASSISTANT

## 2024-03-28 NOTE — LETTER
"3/28/2024       RE: David Mckeon  [address]  [facility] [address]     Dear Colleague,    Thank you for referring your patient, David Mckeon, to the [facility]. Please see a copy of my visit note below.    Virtual Visit Details    Type of service:  Video Visit   Video Start Time:  153  Video End Time: 154    Originating Location (pt. Location): Home    Distant Location (provider location):  On-site  Platform used for Video Visit: Mary Ellen Bush     REQUESTING PROVIDER   Selena Bruner     REASON FOR CONSULT   Urinary urgency and frequency    HISTORY OF PRESENT ILLNESS   Ms. Alhaji Mckeon is very pleasant 73 year old year old, , female, who presents today for further evaluation recommendations regarding new urgency and frequency of urination with inability to stop her urinary stream.  She notes that this is a change for her over the last 2 months.  She also noted that the first day that this started that she was having some dysuria, but this has resolved.  She denies any gross hematuria or stress incontinence.  She does find possible almost dribbling urge incontinence.  She is having nocturia approximately every 2 hours during the nighttime.  Does note that the daytime is better.  All of these are newer symptoms for her.    She does note that prior to the change in urinary symptoms, she always had a bit of a bulge in her rectum and had to strain a little bit to have bowel movements.  She does note that her bowel movements have now improved.  She also does not see that bulge any longer.  She does not distinctly note prolapse in the vagina, but does wonder if something has \"shifted.\"  She does not feel like she is emptying her bladder well at this time.    Fluid intake includes a lot of water, and rare soda or alcoholic beverage.    Patient will occasionally get urinary tract " infections.  She is not wearing pads.  No concerns for STIs.    She does note some tightness in the back of her legs as well as the muscles in her pelvis.  She denies any falls or recent trauma.  She is postmenopausal.    She does note that she is no longer able to stop her urinary stream.  Prior to these changes approxi-2 months ago, she was able to stop her urinary stream without difficulties.    The following portions of the patient's history were reviewed and updated as appropriate: allergies, current medications, past medical history, past social history, past surgical history, and problem list.     REVIEW OF SYSTEMS   Review of Systems   Constitutional:  Negative for chills and fever.   Respiratory:  Negative for shortness of breath.    Cardiovascular:  Negative for chest pain.   Gastrointestinal:  Negative for nausea and vomiting.   Genitourinary:  Positive for difficulty urinating, dysuria (Now resolved), frequency and urgency. Negative for flank pain and hematuria.   Musculoskeletal:  Positive for myalgias.      Per HPI.     Patient Active Problem List   Diagnosis    History of colonic polyps    CARDIOVASCULAR SCREENING; LDL GOAL LESS THAN 130    Mild major depression (H)    Hyperlipidemia with target LDL less than 100    Diverticulosis of large intestine without hemorrhage    Hemorrhoids    S/P colonoscopy with polypectomy    Morbid obesity (H)    Type 2 diabetes mellitus with stage 3a chronic kidney disease, without long-term current use of insulin (H)    Essential hypertension, benign    S/P total knee replacement using cement, right    Stage 3a chronic kidney disease (H)      Past Medical History:   Diagnosis Date    High cholesterol     Hypertension     Mild major depression (H24) 6/30/2014    Osteoarthritis     right knee    Stage 3a chronic kidney disease (H) 2/22/2023    Type 2 diabetes mellitus (H)       Past Surgical History:   Procedure Laterality Date    ARTHROPLASTY KNEE Right 5/23/2016     Procedure: ARTHROPLASTY KNEE;  Surgeon: Pankaj Schuster MD;  Location: [facility] OR    CARPAL TUNNEL RELEASE RT/LT Bilateral     COLONOSCOPY  6/2015    EYE SURGERY      glasses    SOFT TISSUE SURGERY      carpal tunnel-both hands    STAPEDECTOMY Bilateral       Social History:   Former smoker. Smoked 35 years and quit 2003.     Objective     PHYSICAL EXAM   GENERAL: alert and no distress  EYES: Eyes grossly normal to inspection.  No discharge or erythema, or obvious scleral/conjunctival abnormalities.  HENT: Normal cephalic/atraumatic.  External ears, nose and mouth without ulcers or lesions.  No nasal drainage visible.  NECK: No asymmetry, visible masses or scars  RESP: No audible wheeze, cough, or visible cyanosis.    MS: No gross musculoskeletal defects noted.  Normal range of motion.  No visible edema.  SKIN: Visible skin clear. No significant rash, abnormal pigmentation or lesions.  NEURO: Cranial nerves grossly intact.  Mentation and speech appropriate for age.  PSYCH: Appropriate affect, tone, and pace of words     LABORATORY   Lab Results   Component Value Date    CR 1.03 (H) 02/22/2023       Assessment & Plan   1. Urinary urgency    2. Feeling of incomplete bladder emptying    3. Partial rectal prolapse         I had the pleasure today of meeting with Ms. Alhaji Mckeon to discuss her her change in urinary symptoms, which was rather abrupt approximately 2 months ago.  Prior to that, she was having more difficulties with her bowel movements.  She does not feel that she is emptying her bladder well now and is having more urgency and frequency with almost having episodes of urge incontinence.  She also noticed previous rectal bulge, but has since essentially disappeared.  She denies any urinary retention.    I did discuss with her that there may be some concern for some pelvic organ prolapse as well as possible urinary tract infection or overactivity of the bladder.  Is somewhat difficult to try to discern  what is going on given the inability to do a pelvic examination at this time.  She has not had any recent bladder testing.    -Would recommend a nurse visit for urinalysis, urine culture, Ureaplasma mycoplasma testing and postvoid residual.    -Will plan on adding patient on for a formal pelvic examination in person with further recommendations provided afterwards.    -If fevers, chills, or inability to urinate, please proceed to the emergency department.    Signed by:     Selena Bruner PA-C 3/28/2024 3:37 PM

## 2024-03-28 NOTE — TELEPHONE ENCOUNTER
Selena asked me in person to call pt and add her on for nurse visit next week for urine tests and PVR. Also work her in with Selena for pelvic exam 4/11, 4/17 or 4/19 at 3:30

## 2024-03-28 NOTE — PROGRESS NOTES
"Virtual Visit Details    Type of service:  Video Visit   Video Start Time:  153  Video End Time: 154    Originating Location (pt. Location): Home    Distant Location (provider location):  On-site  Platform used for Video Visit: Mary Ellen Bush      REQUESTING PROVIDER   Selena Bruner     REASON FOR CONSULT   Urinary urgency and frequency    HISTORY OF PRESENT ILLNESS   Ms. Alhaji Mckeon is very pleasant 73 year old year old, , female, who presents today for further evaluation recommendations regarding new urgency and frequency of urination with inability to stop her urinary stream.  She notes that this is a change for her over the last 2 months.  She also noted that the first day that this started that she was having some dysuria, but this has resolved.  She denies any gross hematuria or stress incontinence.  She does find possible almost dribbling urge incontinence.  She is having nocturia approximately every 2 hours during the nighttime.  Does note that the daytime is better.  All of these are newer symptoms for her.    She does note that prior to the change in urinary symptoms, she always had a bit of a bulge in her rectum and had to strain a little bit to have bowel movements.  She does note that her bowel movements have now improved.  She also does not see that bulge any longer.  She does not distinctly note prolapse in the vagina, but does wonder if something has \"shifted.\"  She does not feel like she is emptying her bladder well at this time.    Fluid intake includes a lot of water, and rare soda or alcoholic beverage.    Patient will occasionally get urinary tract infections.  She is not wearing pads.  No concerns for STIs.    She does note some tightness in the back of her legs as well as the muscles in her pelvis.  She denies any falls or recent trauma.  She is postmenopausal.    She does note that she is no longer able to stop her urinary stream.  Prior to these changes approxi-2 " months ago, she was able to stop her urinary stream without difficulties.    The following portions of the patient's history were reviewed and updated as appropriate: allergies, current medications, past medical history, past social history, past surgical history, and problem list.     REVIEW OF SYSTEMS   Review of Systems   Constitutional:  Negative for chills and fever.   Respiratory:  Negative for shortness of breath.    Cardiovascular:  Negative for chest pain.   Gastrointestinal:  Negative for nausea and vomiting.   Genitourinary:  Positive for difficulty urinating, dysuria (Now resolved), frequency and urgency. Negative for flank pain and hematuria.   Musculoskeletal:  Positive for myalgias.      Per HPI.     Patient Active Problem List   Diagnosis    History of colonic polyps    CARDIOVASCULAR SCREENING; LDL GOAL LESS THAN 130    Mild major depression (H)    Hyperlipidemia with target LDL less than 100    Diverticulosis of large intestine without hemorrhage    Hemorrhoids    S/P colonoscopy with polypectomy    Morbid obesity (H)    Type 2 diabetes mellitus with stage 3a chronic kidney disease, without long-term current use of insulin (H)    Essential hypertension, benign    S/P total knee replacement using cement, right    Stage 3a chronic kidney disease (H)      Past Medical History:   Diagnosis Date    High cholesterol     Hypertension     Mild major depression (H24) 6/30/2014    Osteoarthritis     right knee    Stage 3a chronic kidney disease (H) 2/22/2023    Type 2 diabetes mellitus (H)       Past Surgical History:   Procedure Laterality Date    ARTHROPLASTY KNEE Right 5/23/2016    Procedure: ARTHROPLASTY KNEE;  Surgeon: Pankaj Schuster MD;  Location: [facility] OR    CARPAL TUNNEL RELEASE RT/LT Bilateral     COLONOSCOPY  6/2015    EYE SURGERY      glasses    SOFT TISSUE SURGERY      carpal tunnel-both hands    STAPEDECTOMY Bilateral       Social History:   Former smoker. Smoked 35 years and quit 2003.      Objective      PHYSICAL EXAM   GENERAL: alert and no distress  EYES: Eyes grossly normal to inspection.  No discharge or erythema, or obvious scleral/conjunctival abnormalities.  HENT: Normal cephalic/atraumatic.  External ears, nose and mouth without ulcers or lesions.  No nasal drainage visible.  NECK: No asymmetry, visible masses or scars  RESP: No audible wheeze, cough, or visible cyanosis.    MS: No gross musculoskeletal defects noted.  Normal range of motion.  No visible edema.  SKIN: Visible skin clear. No significant rash, abnormal pigmentation or lesions.  NEURO: Cranial nerves grossly intact.  Mentation and speech appropriate for age.  PSYCH: Appropriate affect, tone, and pace of words     LABORATORY   Lab Results   Component Value Date    CR 1.03 (H) 02/22/2023       Assessment & Plan    1. Urinary urgency    2. Feeling of incomplete bladder emptying    3. Partial rectal prolapse         I had the pleasure today of meeting with Ms. Alhaji Mckeon to discuss her her change in urinary symptoms, which was rather abrupt approximately 2 months ago.  Prior to that, she was having more difficulties with her bowel movements.  She does not feel that she is emptying her bladder well now and is having more urgency and frequency with almost having episodes of urge incontinence.  She also noticed previous rectal bulge, but has since essentially disappeared.  She denies any urinary retention.    I did discuss with her that there may be some concern for some pelvic organ prolapse as well as possible urinary tract infection or overactivity of the bladder.  Is somewhat difficult to try to discern what is going on given the inability to do a pelvic examination at this time.  She has not had any recent bladder testing.    -Would recommend a nurse visit for urinalysis, urine culture, Ureaplasma mycoplasma testing and postvoid residual.    -Will plan on adding patient on for a formal pelvic examination in person with  further recommendations provided afterwards.    -If fevers, chills, or inability to urinate, please proceed to the emergency department.    Signed by:     eSlena Bruner PA-C 3/28/2024 3:37 PM

## 2024-03-28 NOTE — NURSING NOTE
Is the patient currently in the state of [place]? YES    Visit mode:VIDEO    If the visit is dropped, the patient can be reconnected by: VIDEO VISIT: Text to cell phone:   Telephone Information:   Mobile [phone]       Will anyone else be joining the visit? NO  (If patient encounters technical issues they should call [phone]    How would you like to obtain your AVS? MyChart    Are changes needed to the allergy or medication list? No, Pt stated no changes to allergies, and Pt stated no med changes    Reason for visit: Consult    Ana MIKE

## 2024-04-03 ASSESSMENT — ENCOUNTER SYMPTOMS
HEMATURIA: 0
DYSURIA: 1
DIFFICULTY URINATING: 1
SHORTNESS OF BREATH: 0
VOMITING: 0
FEVER: 0
MYALGIAS: 1
FLANK PAIN: 0
CHILLS: 0
FREQUENCY: 1
NAUSEA: 0

## 2024-04-03 NOTE — PATIENT INSTRUCTIONS
-Would recommend a nurse visit for urinalysis, urine culture, Ureaplasma mycoplasma testing and postvoid residual.    -Will plan on adding patient on for a formal pelvic examination in person with further recommendations provided afterwards.    -If fevers, chills, or inability to urinate, please proceed to the emergency department.    Contact us in the interim with questions, concerns, or changes in symptomatology.  [phone]

## 2024-04-04 ENCOUNTER — ALLIED HEALTH/NURSE VISIT (OUTPATIENT)
Dept: UROLOGY | Facility: CLINIC | Age: 74
End: 2024-04-04
Payer: COMMERCIAL

## 2024-04-04 DIAGNOSIS — R31.29 MICROSCOPIC HEMATURIA: ICD-10-CM

## 2024-04-04 DIAGNOSIS — R39.14 FEELING OF INCOMPLETE BLADDER EMPTYING: ICD-10-CM

## 2024-04-04 DIAGNOSIS — R39.15 URINARY URGENCY: Primary | ICD-10-CM

## 2024-04-04 LAB
ALBUMIN UR-MCNC: NEGATIVE MG/DL
APPEARANCE UR: ABNORMAL
BACTERIA #/AREA URNS HPF: ABNORMAL /HPF
BILIRUB UR QL STRIP: NEGATIVE
COLOR UR AUTO: YELLOW
GLUCOSE UR STRIP-MCNC: >=1000 MG/DL
HGB UR QL STRIP: ABNORMAL
KETONES UR STRIP-MCNC: NEGATIVE MG/DL
LEUKOCYTE ESTERASE UR QL STRIP: ABNORMAL
MUCOUS THREADS #/AREA URNS LPF: PRESENT /LPF
NITRATE UR QL: POSITIVE
PH UR STRIP: 7 [PH] (ref 5–7)
RBC URINE: 1 /HPF
RESIDUAL VOLUME (RV) (EXTERNAL): 91
SP GR UR STRIP: 1.01 (ref 1–1.03)
UROBILINOGEN UR STRIP-ACNC: 0.2 E.U./DL
WBC URINE: 103 /HPF

## 2024-04-04 PROCEDURE — 87086 URINE CULTURE/COLONY COUNT: CPT

## 2024-04-04 PROCEDURE — 81001 URINALYSIS AUTO W/SCOPE: CPT

## 2024-04-04 PROCEDURE — 87186 SC STD MICRODIL/AGAR DIL: CPT

## 2024-04-04 PROCEDURE — 51798 US URINE CAPACITY MEASURE: CPT

## 2024-04-04 NOTE — NURSING NOTE
David Mckeon comes into clinic today for Urinary Urgency   at the request of LEFTY Eubanks, Ordering Provider for PVR/ UAUC.    PVR: 91 mL by bladder scan    Pt's UA is Nitrite positive today.  Patient states she is not miserable, denies dysuria and fever, and she is willing to wait for the culture to complete before starting antibiotics.    This service provided today was under the supervising provider of the day, Dr. Corrales, who was available if needed.    Diana Healy, EMT

## 2024-04-05 LAB — BACTERIA UR CULT: ABNORMAL

## 2024-04-08 ENCOUNTER — TELEPHONE (OUTPATIENT)
Dept: PHARMACY | Facility: OTHER | Age: 74
End: 2024-04-08
Payer: COMMERCIAL

## 2024-04-08 NOTE — TELEPHONE ENCOUNTER
MTM Recruitment: [employer]     Referral outreach attempt #1 on April 8, 2024      Outcome: patient opted out    Esha Mcneal PharmD  Medication Therapy Management Pharmacist

## 2024-04-11 ENCOUNTER — OFFICE VISIT (OUTPATIENT)
Dept: UROLOGY | Facility: CLINIC | Age: 74
End: 2024-04-11
Payer: COMMERCIAL

## 2024-04-11 VITALS
WEIGHT: 215 LBS | DIASTOLIC BLOOD PRESSURE: 82 MMHG | SYSTOLIC BLOOD PRESSURE: 148 MMHG | BODY MASS INDEX: 35.82 KG/M2 | HEIGHT: 65 IN

## 2024-04-11 DIAGNOSIS — B96.20 E. COLI UTI: Primary | ICD-10-CM

## 2024-04-11 DIAGNOSIS — N81.89 OTHER FEMALE GENITAL PROLAPSE: ICD-10-CM

## 2024-04-11 DIAGNOSIS — R39.15 URINARY URGENCY: ICD-10-CM

## 2024-04-11 DIAGNOSIS — N39.0 E. COLI UTI: Primary | ICD-10-CM

## 2024-04-11 PROCEDURE — 99213 OFFICE O/P EST LOW 20 MIN: CPT | Performed by: PHYSICIAN ASSISTANT

## 2024-04-11 ASSESSMENT — ENCOUNTER SYMPTOMS
VOMITING: 0
CHILLS: 0
FREQUENCY: 1
FEVER: 0
NAUSEA: 0
HEMATURIA: 0

## 2024-04-11 ASSESSMENT — PAIN SCALES - GENERAL: PAINLEVEL: NO PAIN (0)

## 2024-04-11 NOTE — LETTER
4/11/2024       RE: David Mckeon  [address]  [place] [facility] [address]     Dear Colleague,    Thank you for referring your patient, David Mckeon, to the [facility] [place] at [facility]. Please see a copy of my visit note below.    Subjective     CHIEF COMPLAINT/REASON FOR VISIT   Follow up on urinary urgency-pelvic exam    HISTORY OF PRESENT ILLNESS   Ms. Alhaji Mckeon is very pleasant 73 year old year old female, who presents today for follow-up regarding urinary urgency, frequency, and nocturia.  I initially met her via video visit on 03/28/2024.  She was having difficulties with urgency, frequency, and nocturia that was rather severe.  She was having nocturia every 2 hours.  Initially she had some mild dysuria with her symptoms.  Patient also felt like something had shifted and she was no longer able to empty her bladder well and was having more difficulties with urination.  These were all new symptoms for her.    Recommendation was for a nurse visit to get a urinalysis, urine culture, Ureaplasma and mycoplasma testing, as well as a postvoid residual.  I also recommended patient have a formal pelvic examination given her symptomatology.    Ureaplasma and mycoplasma testing were negative.  Postvoid residual showed mildly elevated postvoid residual at 91 mL.  Urine culture was positive for greater than 100,000 CFU per mL of E. coli.  Patient was started on Bactrim DS twice daily for 5 days by my colleague.  Patient has had approximately 1 to 2 days of antibiotics.  She endorses that her fogginess, urinary frequency, and urgency have improved.  She also notes that she was able to sleep for hours and roll at night.  She does feel like her symptomatology is improving.    Patient is on Jardiance.  She has been on this since approximately 2017.    The following portions of the patient's history were reviewed and updated as  "appropriate: allergies, current medications, past family history, past medical history, past social history, past surgical history, and problem list.     REVIEW OF SYSTEMS   Review of Systems   Constitutional:  Negative for chills and fever.   Gastrointestinal:  Negative for nausea and vomiting.   Genitourinary:  Positive for frequency (Improving) and urgency (Improving). Negative for hematuria.      Per HPI.     Patient Active Problem List   Diagnosis    History of colonic polyps    CARDIOVASCULAR SCREENING; LDL GOAL LESS THAN 130    Mild major depression (H)    Hyperlipidemia with target LDL less than 100    Diverticulosis of large intestine without hemorrhage    Hemorrhoids    S/P colonoscopy with polypectomy    Morbid obesity (H)    Type 2 diabetes mellitus with stage 3a chronic kidney disease, without long-term current use of insulin (H)    Essential hypertension, benign    S/P total knee replacement using cement, right    Stage 3a chronic kidney disease (H)      Past Medical History:   Diagnosis Date    High cholesterol     Hypertension     Mild major depression (H24) 06/30/2014    Osteoarthritis     right knee    Stage 3a chronic kidney disease (H) 02/22/2023    Type 2 diabetes mellitus (H)         Objective     PHYSICAL EXAM   BP (!) 148/82   Ht 1.651 m (5' 5\")   Wt 97.5 kg (215 lb)   BMI 35.78 kg/m     Physical Exam  Constitutional:       Appearance: Normal appearance.   HENT:      Head: Normocephalic.   Eyes:      General: No scleral icterus.  Pulmonary:      Effort: Pulmonary effort is normal.   Genitourinary:     Comments: Normal intact perineal sensation bilaterally.  Normal-appearing internal/external labia.  Evidence of atrophy of the vaginal tissue.  Grade 2 anterior prolapse, grade 1-2 apical prolapse, grade 2-3 posterior prolapse.  Kegel's 1 out of 5, weak.  No palpable urethral masses.  Mild hypermobility of the urethra appreciated, but no stress incontinence elicited with cough or Valsalva.  " No tenderness with palpation of the levators.  Skin:     Findings: No rash.   Neurological:      General: No focal deficit present.      Mental Status: She is alert and oriented to person, place, and time.   Psychiatric:         Mood and Affect: Mood normal.         Behavior: Behavior normal.       LABORATORY     Recent Labs   Lab Test 04/04/24  0912 04/04/24  0906   COLOR  --  Yellow   APPEARANCE  --  Slightly Cloudy*   URINEGLC  --  >=1000*   URINEBILI  --  Negative   URINEKETONE  --  Negative   SG  --  1.015   UBLD  --  Trace*   URINEPH  --  7.0   PROTEIN  --  Negative   UROBILINOGEN  --  0.2   NITRITE  --  Positive*   LEUKEST  --  Small*   RBCU 1  --    WBCU 103*  --      Recent urine culture greater than 100,000 CFU per mL of E. coli  Ureaplasma and Mycoplasma testing: negative    TESTING    PVR: 91 mL    Assessment & Plan   1. E. coli UTI    2. Urinary urgency    3. Other female genital prolapse        I had the pleasure today of meeting with Ms. Alhaji Mckeon to discuss her urinary symptomatology as well as completed pelvic examination to look for any concerning findings.  She was recently diagnosed with an E. coli urinary tract infection.  She has had approximately 1-1/2 to 2 days of antibiotics, and is already feeling a bit of improvement in her urinary symptomatology.  We discussed her pelvic examination does not show anything concerning, but she does have evidence of global prolapse with fairly prominent anterior and posterior prolapse.  She is able to empty her bladder adequately and feels like her urinary symptoms are generally not an issue.    Will plan on the following:    -Continue with Bactrim for E. Coli UTI.  If symptoms are not full resolved after antibiotic is done, contact us, and I would extend your Bactrim for 3-5 more days given the amount to time that you had symptoms.    -On exam, you have global prolapse.  If difficulties with emptying or pressure, would consider pelvic floor PT (muscles  are weak with prolapse) or pessary.  If not that bothersome, can monitor.  Patient elected to monitor for now.    Contact us in the interim with questions, concerns, or changes in symptomatology.    Signed by:       Selena Bruner PA-C 4/11/2024 3:17 PM

## 2024-04-11 NOTE — PROGRESS NOTES
Subjective      CHIEF COMPLAINT/REASON FOR VISIT   Follow up on urinary urgency-pelvic exam    HISTORY OF PRESENT ILLNESS   Ms. Alhaji Mckeon is very pleasant 73 year old year old female, who presents today for follow-up regarding urinary urgency, frequency, and nocturia.  I initially met her via video visit on 03/28/2024.  She was having difficulties with urgency, frequency, and nocturia that was rather severe.  She was having nocturia every 2 hours.  Initially she had some mild dysuria with her symptoms.  Patient also felt like something had shifted and she was no longer able to empty her bladder well and was having more difficulties with urination.  These were all new symptoms for her.    Recommendation was for a nurse visit to get a urinalysis, urine culture, Ureaplasma and mycoplasma testing, as well as a postvoid residual.  I also recommended patient have a formal pelvic examination given her symptomatology.    Ureaplasma and mycoplasma testing were negative.  Postvoid residual showed mildly elevated postvoid residual at 91 mL.  Urine culture was positive for greater than 100,000 CFU per mL of E. coli.  Patient was started on Bactrim DS twice daily for 5 days by my colleague.  Patient has had approximately 1 to 2 days of antibiotics.  She endorses that her fogginess, urinary frequency, and urgency have improved.  She also notes that she was able to sleep for hours and roll at night.  She does feel like her symptomatology is improving.    Patient is on Jardiance.  She has been on this since approximately 2017.    The following portions of the patient's history were reviewed and updated as appropriate: allergies, current medications, past family history, past medical history, past social history, past surgical history, and problem list.     REVIEW OF SYSTEMS   Review of Systems   Constitutional:  Negative for chills and fever.   Gastrointestinal:  Negative for nausea and vomiting.   Genitourinary:  Positive for  "frequency (Improving) and urgency (Improving). Negative for hematuria.      Per HPI.     Patient Active Problem List   Diagnosis    History of colonic polyps    CARDIOVASCULAR SCREENING; LDL GOAL LESS THAN 130    Mild major depression (H)    Hyperlipidemia with target LDL less than 100    Diverticulosis of large intestine without hemorrhage    Hemorrhoids    S/P colonoscopy with polypectomy    Morbid obesity (H)    Type 2 diabetes mellitus with stage 3a chronic kidney disease, without long-term current use of insulin (H)    Essential hypertension, benign    S/P total knee replacement using cement, right    Stage 3a chronic kidney disease (H)      Past Medical History:   Diagnosis Date    High cholesterol     Hypertension     Mild major depression (H24) 06/30/2014    Osteoarthritis     right knee    Stage 3a chronic kidney disease (H) 02/22/2023    Type 2 diabetes mellitus (H)         Objective      PHYSICAL EXAM   BP (!) 148/82   Ht 1.651 m (5' 5\")   Wt 97.5 kg (215 lb)   BMI 35.78 kg/m     Physical Exam  Constitutional:       Appearance: Normal appearance.   HENT:      Head: Normocephalic.   Eyes:      General: No scleral icterus.  Pulmonary:      Effort: Pulmonary effort is normal.   Genitourinary:     Comments: Normal intact perineal sensation bilaterally.  Normal-appearing internal/external labia.  Evidence of atrophy of the vaginal tissue.  Grade 2 anterior prolapse, grade 1-2 apical prolapse, grade 2-3 posterior prolapse.  Kegel's 1 out of 5, weak.  No palpable urethral masses.  Mild hypermobility of the urethra appreciated, but no stress incontinence elicited with cough or Valsalva.  No tenderness with palpation of the levators.  Skin:     Findings: No rash.   Neurological:      General: No focal deficit present.      Mental Status: She is alert and oriented to person, place, and time.   Psychiatric:         Mood and Affect: Mood normal.         Behavior: Behavior normal.       LABORATORY     Recent Labs "   Lab Test 04/04/24  0912 04/04/24  0906   COLOR  --  Yellow   APPEARANCE  --  Slightly Cloudy*   URINEGLC  --  >=1000*   URINEBILI  --  Negative   URINEKETONE  --  Negative   SG  --  1.015   UBLD  --  Trace*   URINEPH  --  7.0   PROTEIN  --  Negative   UROBILINOGEN  --  0.2   NITRITE  --  Positive*   LEUKEST  --  Small*   RBCU 1  --    WBCU 103*  --      Recent urine culture greater than 100,000 CFU per mL of E. coli  Ureaplasma and Mycoplasma testing: negative    TESTING    PVR: 91 mL    Assessment & Plan    1. E. coli UTI    2. Urinary urgency    3. Other female genital prolapse        I had the pleasure today of meeting with Ms. Alhaji Mckeon to discuss her urinary symptomatology as well as completed pelvic examination to look for any concerning findings.  She was recently diagnosed with an E. coli urinary tract infection.  She has had approximately 1-1/2 to 2 days of antibiotics, and is already feeling a bit of improvement in her urinary symptomatology.  We discussed her pelvic examination does not show anything concerning, but she does have evidence of global prolapse with fairly prominent anterior and posterior prolapse.  She is able to empty her bladder adequately and feels like her urinary symptoms are generally not an issue.    Will plan on the following:    -Continue with Bactrim for E. Coli UTI.  If symptoms are not full resolved after antibiotic is done, contact us, and I would extend your Bactrim for 3-5 more days given the amount to time that you had symptoms.    -On exam, you have global prolapse.  If difficulties with emptying or pressure, would consider pelvic floor PT (muscles are weak with prolapse) or pessary.  If not that bothersome, can monitor.  Patient elected to monitor for now.    Contact us in the interim with questions, concerns, or changes in symptomatology.    Signed by:       Selena Bruner PA-C 4/11/2024 3:17 PM

## 2024-04-11 NOTE — NURSING NOTE
Chief Complaint   Patient presents with    Urinary urgency     Pt here for pelvic exam      Ciera Lora, CMA

## 2024-04-11 NOTE — PATIENT INSTRUCTIONS
Continue with Bactrim for E. Coli UTI.  If symptoms are not full resolved after antibiotic is done, contact us, and I would extend your Bactrim for 3-5 more days given the amount to time that you had symptoms.    On exam, you have global prolapse.  If difficulties with emptying or pressure, would consider pelvic floor PT or pessary.  If not that bothersome, can monitor.  You have elected to monitor for now.    Contact us in the interim with questions, concerns, or changes in symptomatology.  [phone]

## 2024-05-01 ASSESSMENT — ANXIETY QUESTIONNAIRES
GAD7 TOTAL SCORE: 7
1. FEELING NERVOUS, ANXIOUS, OR ON EDGE: MORE THAN HALF THE DAYS
GAD7 TOTAL SCORE: 7
7. FEELING AFRAID AS IF SOMETHING AWFUL MIGHT HAPPEN: NOT AT ALL
2. NOT BEING ABLE TO STOP OR CONTROL WORRYING: MORE THAN HALF THE DAYS
7. FEELING AFRAID AS IF SOMETHING AWFUL MIGHT HAPPEN: NOT AT ALL
6. BECOMING EASILY ANNOYED OR IRRITABLE: SEVERAL DAYS
3. WORRYING TOO MUCH ABOUT DIFFERENT THINGS: SEVERAL DAYS
8. IF YOU CHECKED OFF ANY PROBLEMS, HOW DIFFICULT HAVE THESE MADE IT FOR YOU TO DO YOUR WORK, TAKE CARE OF THINGS AT HOME, OR GET ALONG WITH OTHER PEOPLE?: SOMEWHAT DIFFICULT
4. TROUBLE RELAXING: SEVERAL DAYS
5. BEING SO RESTLESS THAT IT IS HARD TO SIT STILL: NOT AT ALL
GAD7 TOTAL SCORE: 7
IF YOU CHECKED OFF ANY PROBLEMS ON THIS QUESTIONNAIRE, HOW DIFFICULT HAVE THESE PROBLEMS MADE IT FOR YOU TO DO YOUR WORK, TAKE CARE OF THINGS AT HOME, OR GET ALONG WITH OTHER PEOPLE: SOMEWHAT DIFFICULT

## 2024-05-07 ENCOUNTER — OFFICE VISIT (OUTPATIENT)
Dept: FAMILY MEDICINE | Facility: CLINIC | Age: 74
End: 2024-05-07
Payer: COMMERCIAL

## 2024-05-07 VITALS
HEART RATE: 84 BPM | OXYGEN SATURATION: 94 % | DIASTOLIC BLOOD PRESSURE: 70 MMHG | HEIGHT: 65 IN | WEIGHT: 209 LBS | TEMPERATURE: 97.8 F | BODY MASS INDEX: 34.82 KG/M2 | RESPIRATION RATE: 12 BRPM | SYSTOLIC BLOOD PRESSURE: 132 MMHG

## 2024-05-07 DIAGNOSIS — N18.31 STAGE 3A CHRONIC KIDNEY DISEASE (H): ICD-10-CM

## 2024-05-07 DIAGNOSIS — F33.1 MODERATE EPISODE OF RECURRENT MAJOR DEPRESSIVE DISORDER (H): ICD-10-CM

## 2024-05-07 DIAGNOSIS — S76.319A HAMSTRING MUSCLE STRAIN, UNSPECIFIED LATERALITY, INITIAL ENCOUNTER: ICD-10-CM

## 2024-05-07 DIAGNOSIS — F41.9 ANXIETY: ICD-10-CM

## 2024-05-07 DIAGNOSIS — N18.31 TYPE 2 DIABETES MELLITUS WITH STAGE 3A CHRONIC KIDNEY DISEASE, WITHOUT LONG-TERM CURRENT USE OF INSULIN (H): Primary | ICD-10-CM

## 2024-05-07 DIAGNOSIS — E11.22 TYPE 2 DIABETES MELLITUS WITH STAGE 3A CHRONIC KIDNEY DISEASE, WITHOUT LONG-TERM CURRENT USE OF INSULIN (H): Primary | ICD-10-CM

## 2024-05-07 PROCEDURE — 99214 OFFICE O/P EST MOD 30 MIN: CPT | Performed by: PHYSICIAN ASSISTANT

## 2024-05-07 RX ORDER — TRAZODONE HYDROCHLORIDE 50 MG/1
50-100 TABLET, FILM COATED ORAL AT BEDTIME
Qty: 60 TABLET | Refills: 1 | Status: SHIPPED | OUTPATIENT
Start: 2024-05-07 | End: 2024-06-13

## 2024-05-07 RX ORDER — RESPIRATORY SYNCYTIAL VIRUS VACCINE 120MCG/0.5
0.5 KIT INTRAMUSCULAR ONCE
Qty: 1 EACH | Refills: 0 | Status: CANCELLED | OUTPATIENT
Start: 2024-05-07 | End: 2024-05-07

## 2024-05-07 ASSESSMENT — ANXIETY QUESTIONNAIRES
2. NOT BEING ABLE TO STOP OR CONTROL WORRYING: NEARLY EVERY DAY
1. FEELING NERVOUS, ANXIOUS, OR ON EDGE: MORE THAN HALF THE DAYS
7. FEELING AFRAID AS IF SOMETHING AWFUL MIGHT HAPPEN: MORE THAN HALF THE DAYS
6. BECOMING EASILY ANNOYED OR IRRITABLE: MORE THAN HALF THE DAYS
3. WORRYING TOO MUCH ABOUT DIFFERENT THINGS: NEARLY EVERY DAY
IF YOU CHECKED OFF ANY PROBLEMS ON THIS QUESTIONNAIRE, HOW DIFFICULT HAVE THESE PROBLEMS MADE IT FOR YOU TO DO YOUR WORK, TAKE CARE OF THINGS AT HOME, OR GET ALONG WITH OTHER PEOPLE: SOMEWHAT DIFFICULT
5. BEING SO RESTLESS THAT IT IS HARD TO SIT STILL: MORE THAN HALF THE DAYS
GAD7 TOTAL SCORE: 17

## 2024-05-07 ASSESSMENT — PATIENT HEALTH QUESTIONNAIRE - PHQ9
5. POOR APPETITE OR OVEREATING: NEARLY EVERY DAY
SUM OF ALL RESPONSES TO PHQ QUESTIONS 1-9: 16

## 2024-05-07 NOTE — PROGRESS NOTES
"  Assessment & Plan     (E11.22,  N18.31) Type 2 diabetes mellitus with stage 3a chronic kidney disease, without long-term current use of insulin (H)  (primary encounter diagnosis)  Comment:   Plan: CANCELED: HEMOGLOBIN A1C        Await BASHIR and labs from [facility]    (F33.1) Moderate episode of recurrent major depressive disorder (H)  Comment:   Plan: continue wellbutrin. Will add prozac    (N18.31) Stage 3a chronic kidney disease (H)  Comment:   Plan: await labs    (F41.9) Anxiety  Comment:   Plan: FLUoxetine (PROZAC) 20 MG capsule, traZODone         (DESYREL) 50 MG tablet            (S76.319A) Hamstring muscle strain, unspecified laterality, initial encounter  Comment: referral placed.   Plan: Physical Therapy  Referral                    BMI  Estimated body mass index is 34.78 kg/m  as calculated from the following:    Height as of this encounter: 1.651 m (5' 5\").    Weight as of this encounter: 94.8 kg (209 lb).             Rachelle Hernandez is a 73 year old, presenting for the following health issues:  No chief complaint on file.        5/7/2024     8:43 AM   Additional Questions   Roomed by Sally PRINCE     History of Present Illness       Mental Health Follow-up:  Patient presents to follow-up on Depression & Anxiety.Patient's depression since last visit has been:  Worse  The patient is having other symptoms associated with depression.  Patient's anxiety since last visit has been:  Worse  The patient is not having other symptoms associated with anxiety.  Any significant life events: grief or loss and health concerns  Patient is feeling anxious or having panic attacks.  Patient has no concerns about alcohol or drug use.    She eats 0-1 servings of fruits and vegetables daily.She consumes 0 sweetened beverage(s) daily.She exercises with enough effort to increase her heart rate 10 to 19 minutes per day.  She exercises with enough effort to increase her heart rate 3 or less days per week.   She is taking " medications regularly.       [facility] A1c was 6.6%. Endo was managing all meds.   pt will transfer care here.     Feeling tight and tender in hamstrings for several months no known injuruy          Review of Systems  Constitutional, HEENT, cardiovascular, pulmonary, gi and gu systems are negative, except as otherwise noted.      Objective    There were no vitals taken for this visit.  There is no height or weight on file to calculate BMI.  Physical Exam   GENERAL: alert and no distress  MS: tight and tender hamstrings and glutes  PSYCH: mentation appears normal, tearful, and anxious            Signed Electronically by: Cristina Chisholm PA-C

## 2024-05-11 ASSESSMENT — ACTIVITIES OF DAILY LIVING (ADL)
WALKING_BETWEEN_ROOMS: A LITTLE BIT OF DIFFICULTY
PUTTING_ON_YOUR_SHOES_OR_SOCKS: A LITTLE BIT OF DIFFICULTY
GETTING_INTO_AND_OUT_OF_A_BATH: A LITTLE BIT OF DIFFICULTY
RUNNING_ON_UNEVEN_GROUND: MODERATE DIFFICULTY
SITTING_FOR_1_HOUR: A LITTLE BIT OF DIFFICULTY
WALKING_2_BLOCKS: A LITTLE BIT OF DIFFICULTY
RUNNING_ON_EVEN_GROUND: MODERATE DIFFICULTY
WALKING_A_MILE: A LITTLE BIT OF DIFFICULTY
SQUATTING: MODERATE DIFFICULTY
SHOPPING: A LITTLE BIT OF DIFFICULTY
LEFS_RAW_SCORE: 0
ROLLING_OVER_IN_BED: MODERATE DIFFICULTY
GETTING_INTO_OR_OUT_OF_A_CAR: A LITTLE BIT OF DIFFICULTY
MAKING_SHARP_TURNS_WHILE_RUNNING_FAST: A LITTLE BIT OF DIFFICULTY
LEFS_SCORE(%): 0
ANY_OF_YOUR_USUAL_WORK,_HOUSEWORK_OR_SCHOOL_ACTIVITIES: MODERATE DIFFICULTY
LIFTING_AN_OBJECT,_LIKE_A_BAG_OF_GROCERIES_FROM_THE_FLOOR: A LITTLE BIT OF DIFFICULTY
GOING_UP_OR_DOWN_10_STAIRS: MODERATE DIFFICULTY
PERFORMING_HEAVY_ACTIVITIES_AROUND_YOUR_HOME: MODERATE DIFFICULTY
STANDING_FOR_1_HOUR: MODERATE DIFFICULTY
YOUR_USUAL_HOBBIES,_RECREATIONAL_OR_SPORTING_ACTIVITIES: MODERATE DIFFICULTY
PERFORMING_LIGHT_ACTIVITIES_AROUND_YOUR_HOME: A LITTLE BIT OF DIFFICULTY
PLEASE_INDICATE_YOR_PRIMARY_REASON_FOR_REFERRAL_TO_THERAPY:: FOOT AND/OR ANKLE

## 2024-05-13 ENCOUNTER — THERAPY VISIT (OUTPATIENT)
Dept: PHYSICAL THERAPY | Facility: CLINIC | Age: 74
End: 2024-05-13
Attending: PHYSICIAN ASSISTANT
Payer: COMMERCIAL

## 2024-05-13 DIAGNOSIS — S76.319A HAMSTRING MUSCLE STRAIN, UNSPECIFIED LATERALITY, INITIAL ENCOUNTER: ICD-10-CM

## 2024-05-13 PROCEDURE — 97110 THERAPEUTIC EXERCISES: CPT | Mod: GP | Performed by: PHYSICAL THERAPIST

## 2024-05-13 PROCEDURE — 97161 PT EVAL LOW COMPLEX 20 MIN: CPT | Mod: GP | Performed by: PHYSICAL THERAPIST

## 2024-05-13 NOTE — PROGRESS NOTES
PHYSICAL THERAPY EVALUATION  Type of Visit: Evaluation    See electronic medical record for Abuse and Falls Screening details.    Subjective       Pt presents reporting some new pain, weakness, and instability. Pt reporting feeling like she's going to lose her balance when she's walking, feeling weaker. Pain in back of legs in hamstring area. This has been uncomfortable since about March. Increased discomfort with standing up from bed, worse in the AM. 3/10 pain.  Does stairs one at a time on the way up due to weakness and instability. Pt reports no recent falls, denies overt exercise  Presenting condition or subjective complaint: muscles in thighs hurt  Date of onset: 05/07/24 (MD order)    Relevant medical history: Depression; Diabetes; Hearing problems; High blood pressure; Overweight; Vision problems   Dates & types of surgery: Right Knee replacement    Prior diagnostic imaging/testing results: Other No scans or tests   Prior therapy history for the same diagnosis, illness or injury: No        Living Environment  Social support: With a significant other or spouse   Type of home: House   Stairs to enter the home: Yes 2 Is there a railing: No   Ramp: No   Stairs inside the home: Yes 26 Is there a railing: Yes   Help at home: None  Equipment owned:       Employment: No    Hobbies/Interests:      Patient goals for therapy: walk without pain    Pain assessment: Pain present     Objective   HIP EVALUATION  PAIN: Pain Level with Use: 3/10  GAIT:   Weightbearing Status: WBAT  Assistive Device(s): None  Gait Deviations: WNL  BALANCE/PROPRIOCEPTION:  struggles with Single leg balance  WEIGHTBEARING ALIGNMENT: Knee WB Alignment:Genu valgus L, Genu valgus R  ROM: AROM WNL  Normal hip ROM     PELVIC/SI SCREEN: WNL  STRENGTH:  weakness, 3+/4 hip flexion and abduction   LE FLEXIBILITY: WNL  FUNCTIONAL TESTS: Double Leg Squat: Anterior knee translation, Knee valgus, Hip internal rotation, and Improper use of  glutes/hips  PALPATION:  TTP at proximal hamstring    Assessment & Plan   CLINICAL IMPRESSIONS  Medical Diagnosis: hamstring strain    Treatment Diagnosis: B leg pain and weakness   Impression/Assessment: Patient is a 73 year old female with BLE pain and weakness complaints.  The following significant findings have been identified: Pain, Decreased strength, Impaired balance, Impaired muscle performance, Decreased activity tolerance, and Impaired posture. These impairments interfere with their ability to perform self care tasks, recreational activities, and community mobility as compared to previous level of function.     Clinical Decision Making (Complexity):  Clinical Presentation: Stable/Uncomplicated  Clinical Presentation Rationale: based on medical and personal factors listed in PT evaluation  Clinical Decision Making (Complexity): Low complexity    PLAN OF CARE  Treatment Interventions:  Interventions: Gait Training, Manual Therapy, Neuromuscular Re-education, Therapeutic Activity, Therapeutic Exercise    Long Term Goals     PT Goal 1  Goal Identifier: stand up from chair  Goal Description: pt will be able to stand up from chair wtihout leg pain or weakness  Rationale: to maximize safety and independence with performance of ADLs and functional tasks  Target Date: 07/08/24  PT Goal 2  Goal Identifier: walking  Goal Description: pt will be able to walk 10 mins without leg pain or weakness  Rationale: to maximize safety and independence with performance of ADLs and functional tasks  Target Date: 07/08/24      Frequency of Treatment: once per week  Duration of Treatment: 8 weeks    Recommended Referrals to Other Professionals:  NA  Education Assessment:   Learner/Method: Patient  Education Comments: Pt educated on PT role and plan of care    Risks and benefits of evaluation/treatment have been explained.   Patient/Family/caregiver agrees with Plan of Care.     Evaluation Time:     PT Eval, Low Complexity Minutes  (16773): 15       Signing Clinician: Carly Gonzalez PT      Psychiatric                                                                                   OUTPATIENT PHYSICAL THERAPY      PLAN OF TREATMENT FOR OUTPATIENT REHABILITATION   Patient's Last Name, First Name, David Murdock YOB: 1950   Provider's Name   Psychiatric   Medical Record No.  [id number]     Onset Date: 05/07/24 (MD order)  Start of Care Date: 05/13/24     Medical Diagnosis:  hamstring strain      PT Treatment Diagnosis:  B leg pain and weakness Plan of Treatment  Frequency/Duration: once per week/ 8 weeks    Certification date from 05/13/24 to 07/08/24         See note for plan of treatment details and functional goals     Carly Gonzalez, PT                         I CERTIFY THE NEED FOR THESE SERVICES FURNISHED UNDER        THIS PLAN OF TREATMENT AND WHILE UNDER MY CARE     (Physician attestation of this document indicates review and certification of the therapy plan).              Referring Provider:  Cristina Chisholm    Initial Assessment  See Epic Evaluation- Start of Care Date: 05/13/24

## 2024-05-29 DIAGNOSIS — F41.9 ANXIETY: ICD-10-CM

## 2024-05-29 RX ORDER — TRAZODONE HYDROCHLORIDE 50 MG/1
50-100 TABLET, FILM COATED ORAL AT BEDTIME
Qty: 180 TABLET | Refills: 1 | OUTPATIENT
Start: 2024-05-29

## 2024-05-30 ENCOUNTER — THERAPY VISIT (OUTPATIENT)
Dept: PHYSICAL THERAPY | Facility: CLINIC | Age: 74
End: 2024-05-30
Attending: PHYSICIAN ASSISTANT
Payer: COMMERCIAL

## 2024-05-30 DIAGNOSIS — S76.319A HAMSTRING MUSCLE STRAIN, UNSPECIFIED LATERALITY, INITIAL ENCOUNTER: Primary | ICD-10-CM

## 2024-05-30 PROCEDURE — 97110 THERAPEUTIC EXERCISES: CPT | Mod: GP | Performed by: PHYSICAL THERAPIST

## 2024-06-01 ENCOUNTER — HEALTH MAINTENANCE LETTER (OUTPATIENT)
Age: 74
End: 2024-06-01

## 2024-06-11 ASSESSMENT — ANXIETY QUESTIONNAIRES
7. FEELING AFRAID AS IF SOMETHING AWFUL MIGHT HAPPEN: SEVERAL DAYS
2. NOT BEING ABLE TO STOP OR CONTROL WORRYING: NOT AT ALL
1. FEELING NERVOUS, ANXIOUS, OR ON EDGE: NOT AT ALL
IF YOU CHECKED OFF ANY PROBLEMS ON THIS QUESTIONNAIRE, HOW DIFFICULT HAVE THESE PROBLEMS MADE IT FOR YOU TO DO YOUR WORK, TAKE CARE OF THINGS AT HOME, OR GET ALONG WITH OTHER PEOPLE: NOT DIFFICULT AT ALL
5. BEING SO RESTLESS THAT IT IS HARD TO SIT STILL: NOT AT ALL
4. TROUBLE RELAXING: SEVERAL DAYS
3. WORRYING TOO MUCH ABOUT DIFFERENT THINGS: SEVERAL DAYS
8. IF YOU CHECKED OFF ANY PROBLEMS, HOW DIFFICULT HAVE THESE MADE IT FOR YOU TO DO YOUR WORK, TAKE CARE OF THINGS AT HOME, OR GET ALONG WITH OTHER PEOPLE?: NOT DIFFICULT AT ALL
GAD7 TOTAL SCORE: 3
GAD7 TOTAL SCORE: 3
7. FEELING AFRAID AS IF SOMETHING AWFUL MIGHT HAPPEN: SEVERAL DAYS
6. BECOMING EASILY ANNOYED OR IRRITABLE: NOT AT ALL

## 2024-06-13 ENCOUNTER — VIRTUAL VISIT (OUTPATIENT)
Dept: FAMILY MEDICINE | Facility: CLINIC | Age: 74
End: 2024-06-13
Payer: COMMERCIAL

## 2024-06-13 DIAGNOSIS — F41.9 ANXIETY: ICD-10-CM

## 2024-06-13 DIAGNOSIS — I10 HTN, GOAL BELOW 140/90: ICD-10-CM

## 2024-06-13 DIAGNOSIS — N18.31 TYPE 2 DIABETES MELLITUS WITH STAGE 3A CHRONIC KIDNEY DISEASE, WITHOUT LONG-TERM CURRENT USE OF INSULIN (H): Primary | ICD-10-CM

## 2024-06-13 DIAGNOSIS — E11.22 TYPE 2 DIABETES MELLITUS WITH STAGE 3A CHRONIC KIDNEY DISEASE, WITHOUT LONG-TERM CURRENT USE OF INSULIN (H): Primary | ICD-10-CM

## 2024-06-13 DIAGNOSIS — Z78.0 ASYMPTOMATIC POSTMENOPAUSAL STATUS: ICD-10-CM

## 2024-06-13 PROCEDURE — 99214 OFFICE O/P EST MOD 30 MIN: CPT | Mod: 95 | Performed by: PHYSICIAN ASSISTANT

## 2024-06-13 PROCEDURE — G2211 COMPLEX E/M VISIT ADD ON: HCPCS | Mod: 95 | Performed by: PHYSICIAN ASSISTANT

## 2024-06-13 RX ORDER — TRAZODONE HYDROCHLORIDE 50 MG/1
50-100 TABLET, FILM COATED ORAL AT BEDTIME
Qty: 180 TABLET | Refills: 3 | Status: SHIPPED | OUTPATIENT
Start: 2024-06-13

## 2024-06-13 RX ORDER — LOSARTAN POTASSIUM AND HYDROCHLOROTHIAZIDE 25; 100 MG/1; MG/1
1 TABLET ORAL DAILY
Qty: 90 TABLET | Refills: 3 | Status: SHIPPED | OUTPATIENT
Start: 2024-06-13

## 2024-06-13 ASSESSMENT — ANXIETY QUESTIONNAIRES
5. BEING SO RESTLESS THAT IT IS HARD TO SIT STILL: NOT AT ALL
3. WORRYING TOO MUCH ABOUT DIFFERENT THINGS: SEVERAL DAYS
2. NOT BEING ABLE TO STOP OR CONTROL WORRYING: NOT AT ALL
7. FEELING AFRAID AS IF SOMETHING AWFUL MIGHT HAPPEN: NOT AT ALL
1. FEELING NERVOUS, ANXIOUS, OR ON EDGE: NOT AT ALL
GAD7 TOTAL SCORE: 1
6. BECOMING EASILY ANNOYED OR IRRITABLE: NOT AT ALL
IF YOU CHECKED OFF ANY PROBLEMS ON THIS QUESTIONNAIRE, HOW DIFFICULT HAVE THESE PROBLEMS MADE IT FOR YOU TO DO YOUR WORK, TAKE CARE OF THINGS AT HOME, OR GET ALONG WITH OTHER PEOPLE: NOT DIFFICULT AT ALL

## 2024-06-13 ASSESSMENT — PATIENT HEALTH QUESTIONNAIRE - PHQ9
5. POOR APPETITE OR OVEREATING: NOT AT ALL
SUM OF ALL RESPONSES TO PHQ QUESTIONS 1-9: 4

## 2024-06-13 NOTE — PROGRESS NOTES
"David is a 73 year old who is being evaluated via a billable video visit.    How would you like to obtain your AVS? MyChart  If the video visit is dropped, the invitation should be resent by: Text to cell phone: [phone]  Will anyone else be joining your video visit? No      Assessment & Plan     (E11.22,  N18.31) Type 2 diabetes mellitus with stage 3a chronic kidney disease, without long-term current use of insulin (H)  (primary encounter diagnosis)  Comment: Last A1c was 2 months ago and was 7.7%  Plan: Patient is transitioning care from endocrinology back to our clinic.  Will set up follow-up visit in 2 to 3 months for Medicare wellness and diabetes recheck.  If refills come through patient will message us and we will work refill medications.  Med review done today with patient    (F41.9) Anxiety  Comment:   Plan: traZODone (DESYREL) 50 MG tablet, FLUoxetine         (PROZAC) 20 MG capsule        Symptoms much improved with medications    (I10) HTN, goal below 140/90  Comment:   Plan: losartan-hydrochlorothiazide (HYZAAR) 100-25 MG        tablet        Will combine ARB and diuretic for compliance and ease of use.    (Z78.0) Asymptomatic postmenopausal status  Comment:   Plan: DEXA HIP/PELVIS/SPINE - Future        Due. Agreeable.           BMI  Estimated body mass index is 34.78 kg/m  as calculated from the following:    Height as of 5/7/24: 1.651 m (5' 5\").    Weight as of 5/7/24: 94.8 kg (209 lb).             Subjective   David is a 73 year old, presenting for the following health issues:  Recheck Medication        6/13/2024     9:05 AM   Additional Questions   Roomed by Sally PRINCE       Video Start Time: 9:37 AM    History of Present Illness       Mental Health Follow-up:  Patient presents to follow-up on Depression & Anxiety.Patient's depression since last visit has been:  Better  The patient is not having other symptoms associated with depression.  Patient's anxiety since last visit has been:  Better  The " patient is not having other symptoms associated with anxiety.  Any significant life events: health concerns and other  Patient is not feeling anxious or having panic attacks.  Patient has no concerns about alcohol or drug use.    She eats 0-1 servings of fruits and vegetables daily.She consumes 0 sweetened beverage(s) daily.She exercises with enough effort to increase her heart rate 9 or less minutes per day.  She exercises with enough effort to increase her heart rate 3 or less days per week.   She is taking medications regularly.               Review of Systems  Constitutional, HEENT, cardiovascular, pulmonary, gi and gu systems are negative, except as otherwise noted.      Objective           Vitals:  No vitals were obtained today due to virtual visit.    Physical Exam   GENERAL: alert and no distress  EYES: Eyes grossly normal to inspection.  No discharge or erythema, or obvious scleral/conjunctival abnormalities.  RESP: No audible wheeze, cough, or visible cyanosis.    SKIN: Visible skin clear. No significant rash, abnormal pigmentation or lesions.  NEURO: Cranial nerves grossly intact.  Mentation and speech appropriate for age.  PSYCH: Appropriate affect, tone, and pace of words          Video-Visit Details    Type of service:  Video Visit   Video End Time:10:08 AM  Originating Location (pt. Location): Home    Distant Location (provider location):  Off-site  Platform used for Video Visit: Mary Ellen  Signed Electronically by: Cristina Chisholm PA-C

## 2024-07-08 PROBLEM — S76.319A HAMSTRING MUSCLE STRAIN, UNSPECIFIED LATERALITY, INITIAL ENCOUNTER: Status: RESOLVED | Noted: 2024-05-13 | Resolved: 2024-07-08

## 2024-08-19 SDOH — HEALTH STABILITY: PHYSICAL HEALTH: ON AVERAGE, HOW MANY DAYS PER WEEK DO YOU ENGAGE IN MODERATE TO STRENUOUS EXERCISE (LIKE A BRISK WALK)?: 2 DAYS

## 2024-08-19 SDOH — HEALTH STABILITY: PHYSICAL HEALTH: ON AVERAGE, HOW MANY MINUTES DO YOU ENGAGE IN EXERCISE AT THIS LEVEL?: 30 MIN

## 2024-08-19 ASSESSMENT — SOCIAL DETERMINANTS OF HEALTH (SDOH): HOW OFTEN DO YOU GET TOGETHER WITH FRIENDS OR RELATIVES?: ONCE A WEEK

## 2024-08-21 ENCOUNTER — OFFICE VISIT (OUTPATIENT)
Dept: FAMILY MEDICINE | Facility: CLINIC | Age: 74
End: 2024-08-21
Attending: PHYSICIAN ASSISTANT
Payer: COMMERCIAL

## 2024-08-21 VITALS
WEIGHT: 210 LBS | OXYGEN SATURATION: 95 % | DIASTOLIC BLOOD PRESSURE: 70 MMHG | TEMPERATURE: 97.8 F | HEIGHT: 64 IN | HEART RATE: 74 BPM | SYSTOLIC BLOOD PRESSURE: 136 MMHG | BODY MASS INDEX: 35.85 KG/M2 | RESPIRATION RATE: 12 BRPM

## 2024-08-21 DIAGNOSIS — N18.31 STAGE 3A CHRONIC KIDNEY DISEASE (H): ICD-10-CM

## 2024-08-21 DIAGNOSIS — E11.22 TYPE 2 DIABETES MELLITUS WITH STAGE 3A CHRONIC KIDNEY DISEASE, WITHOUT LONG-TERM CURRENT USE OF INSULIN (H): ICD-10-CM

## 2024-08-21 DIAGNOSIS — E66.01 CLASS 2 SEVERE OBESITY DUE TO EXCESS CALORIES WITH SERIOUS COMORBIDITY AND BODY MASS INDEX (BMI) OF 36.0 TO 36.9 IN ADULT (H): ICD-10-CM

## 2024-08-21 DIAGNOSIS — Z79.899 MEDICATION MANAGEMENT: ICD-10-CM

## 2024-08-21 DIAGNOSIS — E66.812 CLASS 2 SEVERE OBESITY DUE TO EXCESS CALORIES WITH SERIOUS COMORBIDITY AND BODY MASS INDEX (BMI) OF 36.0 TO 36.9 IN ADULT (H): ICD-10-CM

## 2024-08-21 DIAGNOSIS — N18.31 TYPE 2 DIABETES MELLITUS WITH STAGE 3A CHRONIC KIDNEY DISEASE, WITHOUT LONG-TERM CURRENT USE OF INSULIN (H): ICD-10-CM

## 2024-08-21 DIAGNOSIS — Z00.00 ENCOUNTER FOR MEDICARE ANNUAL WELLNESS EXAM: Primary | ICD-10-CM

## 2024-08-21 LAB
ANION GAP SERPL CALCULATED.3IONS-SCNC: 12 MMOL/L (ref 7–15)
BUN SERPL-MCNC: 14.5 MG/DL (ref 8–23)
CALCIUM SERPL-MCNC: 10 MG/DL (ref 8.8–10.4)
CHLORIDE SERPL-SCNC: 101 MMOL/L (ref 98–107)
CHOLEST SERPL-MCNC: 155 MG/DL
CREAT SERPL-MCNC: 1.06 MG/DL (ref 0.51–0.95)
CREAT UR-MCNC: 36.9 MG/DL
EGFRCR SERPLBLD CKD-EPI 2021: 55 ML/MIN/1.73M2
ERYTHROCYTE [DISTWIDTH] IN BLOOD BY AUTOMATED COUNT: 13.7 % (ref 10–15)
FASTING STATUS PATIENT QL REPORTED: NO
FASTING STATUS PATIENT QL REPORTED: NO
GLUCOSE SERPL-MCNC: 170 MG/DL (ref 70–99)
HBA1C MFR BLD: 5.9 % (ref 0–5.6)
HCO3 SERPL-SCNC: 27 MMOL/L (ref 22–29)
HCT VFR BLD AUTO: 45.9 % (ref 35–47)
HDLC SERPL-MCNC: 64 MG/DL
HGB BLD-MCNC: 14.7 G/DL (ref 11.7–15.7)
LDLC SERPL CALC-MCNC: 58 MG/DL
MCH RBC QN AUTO: 29.2 PG (ref 26.5–33)
MCHC RBC AUTO-ENTMCNC: 32 G/DL (ref 31.5–36.5)
MCV RBC AUTO: 91 FL (ref 78–100)
MICROALBUMIN UR-MCNC: <12 MG/L
MICROALBUMIN/CREAT UR: NORMAL MG/G{CREAT}
NONHDLC SERPL-MCNC: 91 MG/DL
PLATELET # BLD AUTO: 334 10E3/UL (ref 150–450)
POTASSIUM SERPL-SCNC: 4.6 MMOL/L (ref 3.4–5.3)
RBC # BLD AUTO: 5.04 10E6/UL (ref 3.8–5.2)
SODIUM SERPL-SCNC: 140 MMOL/L (ref 135–145)
TRIGL SERPL-MCNC: 164 MG/DL
WBC # BLD AUTO: 8.5 10E3/UL (ref 4–11)

## 2024-08-21 PROCEDURE — 99207 PR FOOT EXAM NO CHARGE: CPT | Mod: 25 | Performed by: PHYSICIAN ASSISTANT

## 2024-08-21 PROCEDURE — G0439 PPPS, SUBSEQ VISIT: HCPCS | Performed by: PHYSICIAN ASSISTANT

## 2024-08-21 PROCEDURE — 80048 BASIC METABOLIC PNL TOTAL CA: CPT | Performed by: PHYSICIAN ASSISTANT

## 2024-08-21 PROCEDURE — 83036 HEMOGLOBIN GLYCOSYLATED A1C: CPT | Performed by: PHYSICIAN ASSISTANT

## 2024-08-21 PROCEDURE — 82043 UR ALBUMIN QUANTITATIVE: CPT | Performed by: PHYSICIAN ASSISTANT

## 2024-08-21 PROCEDURE — 85027 COMPLETE CBC AUTOMATED: CPT | Performed by: PHYSICIAN ASSISTANT

## 2024-08-21 PROCEDURE — 82570 ASSAY OF URINE CREATININE: CPT | Performed by: PHYSICIAN ASSISTANT

## 2024-08-21 PROCEDURE — 80061 LIPID PANEL: CPT | Performed by: PHYSICIAN ASSISTANT

## 2024-08-21 PROCEDURE — 36415 COLL VENOUS BLD VENIPUNCTURE: CPT | Performed by: PHYSICIAN ASSISTANT

## 2024-08-21 PROCEDURE — 99214 OFFICE O/P EST MOD 30 MIN: CPT | Mod: 25 | Performed by: PHYSICIAN ASSISTANT

## 2024-08-21 ASSESSMENT — PATIENT HEALTH QUESTIONNAIRE - PHQ9: SUM OF ALL RESPONSES TO PHQ QUESTIONS 1-9: 2

## 2024-08-21 NOTE — PATIENT INSTRUCTIONS
Patient Education   Preventive Care Advice   This is general advice given by our system to help you stay healthy. However, your care team may have specific advice just for you. Please talk to your care team about your preventive care needs.  Nutrition  Eat 5 or more servings of fruits and vegetables each day.  Try wheat bread, brown rice and whole grain pasta (instead of white bread, rice, and pasta).  Get enough calcium and vitamin D. Check the label on foods and aim for 100% of the RDA (recommended daily allowance).  Lifestyle  Exercise at least 150 minutes each week  (30 minutes a day, 5 days a week).  Do muscle strengthening activities 2 days a week. These help control your weight and prevent disease.  No smoking.  Wear sunscreen to prevent skin cancer.  Have a dental exam and cleaning every 6 months.  Yearly exams  See your health care team every year to talk about:  Any changes in your health.  Any medicines your care team has prescribed.  Preventive care, family planning, and ways to prevent chronic diseases.  Shots (vaccines)   HPV shots (up to age 26), if you've never had them before.  Hepatitis B shots (up to age 59), if you've never had them before.  COVID-19 shot: Get this shot when it's due.  Flu shot: Get a flu shot every year.  Tetanus shot: Get a tetanus shot every 10 years.  Pneumococcal, hepatitis A, and RSV shots: Ask your care team if you need these based on your risk.  Shingles shot (for age 50 and up)  General health tests  Diabetes screening:  Starting at age 35, Get screened for diabetes at least every 3 years.  If you are younger than age 35, ask your care team if you should be screened for diabetes.  Cholesterol test: At age 39, start having a cholesterol test every 5 years, or more often if advised.  Bone density scan (DEXA): At age 50, ask your care team if you should have this scan for osteoporosis (brittle bones).  Hepatitis C: Get tested at least once in your life.  STIs (sexually  transmitted infections)  Before age 24: Ask your care team if you should be screened for STIs.  After age 24: Get screened for STIs if you're at risk. You are at risk for STIs (including HIV) if:  You are sexually active with more than one person.  You don't use condoms every time.  You or a partner was diagnosed with a sexually transmitted infection.  If you are at risk for HIV, ask about PrEP medicine to prevent HIV.  Get tested for HIV at least once in your life, whether you are at risk for HIV or not.  Cancer screening tests  Cervical cancer screening: If you have a cervix, begin getting regular cervical cancer screening tests starting at age 21.  Breast cancer scan (mammogram): If you've ever had breasts, begin having regular mammograms starting at age 40. This is a scan to check for breast cancer.  Colon cancer screening: It is important to start screening for colon cancer at age 45.  Have a colonoscopy test every 10 years (or more often if you're at risk) Or, ask your provider about stool tests like a FIT test every year or Cologuard test every 3 years.  To learn more about your testing options, visit:   .  For help making a decision, visit:   https://bit.ly/ss58993.  Prostate cancer screening test: If you have a prostate, ask your care team if a prostate cancer screening test (PSA) at age 55 is right for you.  Lung cancer screening: If you are a current or former smoker ages 50 to 80, ask your care team if ongoing lung cancer screenings are right for you.  For informational purposes only. Not to replace the advice of your health care provider. Copyright   2023 [facility]. All rights reserved. Clinically reviewed by the [facility]. [place] 073513 - REV 01/24.  Preventing Falls: Care Instructions  Injuries and health problems such as trouble walking or poor eyesight can increase your risk of falling. So can some medicines. But there are things you can do to help  "prevent falls. You can exercise to get stronger. You can also arrange your home to make it safer.    Talk to your doctor about the medicines you take. Ask if any of them increase the risk of falls and whether they can be changed or stopped.   Try to exercise regularly. It can help improve your strength and balance. This can help lower your risk of falling.     Practice fall safety and prevention.    Wear low-heeled shoes that fit well and give your feet good support. Talk to your doctor if you have foot problems that make this hard.  Carry a cellphone or wear a medical alert device that you can use to call for help.  Use stepladders instead of chairs to reach high objects. Don't climb if you're at risk for falls. Ask for help, if needed.  Wear the correct eyeglasses, if you need them.    Make your home safer.    Remove rugs, cords, clutter, and furniture from walkways.  Keep your house well lit. Use night-lights in hallways and bathrooms.  Install and use sturdy handrails on stairways.  Wear nonskid footwear, even inside. Don't walk barefoot or in socks without shoes.    Be safe outside.    Use handrails, curb cuts, and ramps whenever possible.  Keep your hands free by using a shoulder bag or backpack.  Try to walk in well-lit areas. Watch out for uneven ground, changes in pavement, and debris.  Be careful in the winter. Walk on the grass or gravel when sidewalks are slippery. Use de-icer on steps and walkways. Add non-slip devices to shoes.    Put grab bars and nonskid mats in your shower or tub and near the toilet. Try to use a shower chair or bath bench when bathing.   Get into a tub or shower by putting in your weaker leg first. Get out with your strong side first. Have a phone or medical alert device in the bathroom with you.   Where can you learn more?  Go to https://www.[employer].net/patiented  Enter G117 in the search box to learn more about \"Preventing Falls: Care Instructions.\"  Current as of: July 17, " 2023               Content Version: 14.0    1034-0341 [employer].   Care instructions adapted under license by your healthcare professional. If you have questions about a medical condition or this instruction, always ask your healthcare professional. [employer] disclaims any warranty or liability for your use of this information.      Hearing Loss: Care Instructions  Overview     Hearing loss is a sudden or slow decrease in how well you hear. It can range from slight to profound. Permanent hearing loss can occur with aging. It also can happen when you are exposed long-term to loud noise. Examples include listening to loud music, riding motorcycles, or being around other loud machines.  Hearing loss can affect your work and home life. It can make you feel lonely or depressed. You may feel that you have lost your independence. But hearing aids and other devices can help you hear better and feel connected to others.  Follow-up care is a key part of your treatment and safety. Be sure to make and go to all appointments, and call your doctor if you are having problems. It's also a good idea to know your test results and keep a list of the medicines you take.  How can you care for yourself at home?  Avoid loud noises whenever possible. This helps keep your hearing from getting worse.  Always wear hearing protection around loud noises.  Wear a hearing aid as directed.  A professional can help you pick a hearing aid that will work best for you.  You can also get hearing aids over the counter for mild to moderate hearing loss.  Have hearing tests as your doctor suggests. They can show whether your hearing has changed. Your hearing aid may need to be adjusted.  Use other devices as needed. These may include:  Telephone amplifiers and hearing aids that can connect to a television, stereo, radio, or microphone.  Devices that use lights or vibrations. These alert you to the doorbell, a ringing  "telephone, or a baby monitor.  Television closed-captioning. This shows the words at the bottom of the screen. Most new TVs can do this.  TTY (text telephone). This lets you type messages back and forth on the telephone instead of talking or listening. These devices are also called TDD. When messages are typed on the keyboard, they are sent over the phone line to a receiving TTY. The message is shown on a monitor.  Use text messaging, social media, and email if it is hard for you to communicate by telephone.  Try to learn a listening technique called speechreading. It is not lipreading. You pay attention to people's gestures, expressions, posture, and tone of voice. These clues can help you understand what a person is saying. Face the person you are talking to, and have them face you. Make sure the lighting is good. You need to see the other person's face clearly.  Think about counseling if you need help to adjust to your hearing loss.  When should you call for help?  Watch closely for changes in your health, and be sure to contact your doctor if:    You think your hearing is getting worse.     You have new symptoms, such as dizziness or nausea.   Where can you learn more?  Go to https://www.[employer].net/patiented  Enter R798 in the search box to learn more about \"Hearing Loss: Care Instructions.\"  Current as of: September 27, 2023               Content Version: 14.0    9471-0486 [employer].   Care instructions adapted under license by your healthcare professional. If you have questions about a medical condition or this instruction, always ask your healthcare professional. [employer] disclaims any warranty or liability for your use of this information.      Bladder Training: Care Instructions  Your Care Instructions     Bladder training is used to treat urge incontinence and stress incontinence. Urge incontinence means that the need to urinate comes on so fast that you can't get to a " toilet in time. Stress incontinence means that you leak urine because of pressure on your bladder. For example, it may happen when you laugh, cough, or lift something heavy.  Bladder training can increase how long you can wait before you have to urinate. It can also help your bladder hold more urine. And it can give you better control over the urge to urinate.  It is important to remember that bladder training takes a few weeks to a few months to make a difference. You may not see results right away, but don't give up.  Follow-up care is a key part of your treatment and safety. Be sure to make and go to all appointments, and call your doctor if you are having problems. It's also a good idea to know your test results and keep a list of the medicines you take.  How can you care for yourself at home?  Work with your doctor to come up with a bladder training program that is right for you. You may use one or more of the following methods.  Delayed urination  In the beginning, try to keep from urinating for 5 minutes after you first feel the need to go.  While you wait, take deep, slow breaths to relax. Kegel exercises can also help you delay the need to go to the bathroom.  After some practice, when you can easily wait 5 minutes to urinate, try to wait 10 minutes before you urinate.  Slowly increase the waiting period until you are able to control when you have to urinate.  Scheduled urination  Empty your bladder when you first wake up in the morning.  Schedule times throughout the day when you will urinate.  Start by going to the bathroom every hour, even if you don't need to go.  Slowly increase the time between trips to the bathroom.  When you have found a schedule that works well for you, keep doing it.  If you wake up during the night and have to urinate, do it. Apply your schedule to waking hours only.  Kegel exercises  These tighten and strengthen pelvic muscles, which can help you control the flow of urine. (If  "doing these exercises causes pain, stop doing them and talk with your doctor.) To do Kegel exercises:  Squeeze your muscles as if you were trying not to pass gas. Or squeeze your muscles as if you were stopping the flow of urine. Your belly, legs, and buttocks shouldn't move.  Hold the squeeze for 3 seconds, then relax for 5 to 10 seconds.  Start with 3 seconds, then add 1 second each week until you are able to squeeze for 10 seconds.  Repeat the exercise 10 times a session. Do 3 to 8 sessions a day.  When should you call for help?  Watch closely for changes in your health, and be sure to contact your doctor if:    Your incontinence is getting worse.     You do not get better as expected.   Where can you learn more?  Go to https://www.[employer].net/patiented  Enter V684 in the search box to learn more about \"Bladder Training: Care Instructions.\"  Current as of: November 15, 2023               Content Version: 14.0    0236-8585 [employer].   Care instructions adapted under license by your healthcare professional. If you have questions about a medical condition or this instruction, always ask your healthcare professional. [employer] disclaims any warranty or liability for your use of this information.         "

## 2024-08-21 NOTE — PROGRESS NOTES
"Preventive Care Visit  [facility]  Cristina Chisholm PA-C, Family Medicine  Aug 21, 2024      Assessment & Plan     (Z00.00) Encounter for Medicare annual wellness exam  (primary encounter diagnosis)  Comment:   Plan: Med Therapy Management Referral, ME ANNUAL         WELLNESS VISIT, PPS, SUBSEQUENT            (E11.22,  N18.31) Type 2 diabetes mellitus with stage 3a chronic kidney disease, without long-term current use of insulin (H)  Comment: await labs. States she does not need refills today.   Plan: HEMOGLOBIN A1C, Lipid panel reflex to direct         LDL Non-fasting, CBC with platelets, FOOT EXAM,        OFFICE/OUTPT VISIT,EST,LEVL IV            (N18.31) Stage 3a chronic kidney disease (H)  Comment: await labs. On ARB  Plan: BASIC METABOLIC PANEL, Albumin Random Urine         Quantitative with Creat Ratio, OFFICE/OUTPT         VISIT,EST,LEVL IV            (E66.01,  Z68.36) Class 2 severe obesity due to excess calories with serious comorbidity and body mass index (BMI) of 36.0 to 36.9 in adult (H)  Comment: continue diet and exercise to help with DM2  Plan: OFFICE/OUTPT VISIT,EST,LEVL IV            (Z79.899) Medication management  Comment:   Plan: Med Therapy Management Referral            Patient has been advised of split billing requirements and indicates understanding: No        BMI  Estimated body mass index is 36.05 kg/m  as calculated from the following:    Height as of this encounter: 1.626 m (5' 4\").    Weight as of this encounter: 95.3 kg (210 lb).   Weight management plan: Discussed healthy diet and exercise guidelines    Counseling  Appropriate preventive services were addressed with this patient via screening, questionnaire, or discussion as appropriate for fall prevention, nutrition, physical activity, Tobacco-use cessation, social engagement, weight loss and cognition.  Checklist reviewing preventive services available has been given to the patient.  Reviewed patient's " diet, addressing concerns and/or questions.   She is at risk for lack of exercise and has been provided with information to increase physical activity for the benefit of her well-being.   She is at risk for psychosocial distress and has been provided with information to reduce risk.   The patient was provided with written information regarding signs of hearing loss.   Information on urinary incontinence and treatment options given to patient.       See Patient Instructions    Rachelle Hernandez is a 73 year old, presenting for the following:  Wellness Visit and Diabetes        8/21/2024    10:17 AM   Additional Questions   Roomed by Sally PRINCE     Health Care Directive  Patient has a Health Care Directive on file  Advance care planning document is on file and is current.    HPI      Diabetes Follow-up    How often are you checking your blood sugar? Not at all  What concerns do you have today about your diabetes? None   Do you have any of these symptoms? (Select all that apply)  No numbness or tingling in feet.  No redness, sores or blisters on feet.  No complaints of excessive thirst.  No reports of blurry vision.  No significant changes to weight.      BP Readings from Last 2 Encounters:   08/21/24 136/70   05/07/24 132/70     Hemoglobin A1C (%)   Date Value   02/22/2023 6.2 (H)   11/02/2020 6.0 (H)   03/09/2020 5.9 (H)     LDL Cholesterol Calculated (mg/dL)   Date Value   02/22/2023 68   11/02/2020 47   12/03/2015 76             Chronic Kidney Disease Follow-up    Do you take any over the counter pain medicine?: No        8/19/2024   General Health   How would you rate your overall physical health? Good   Feel stress (tense, anxious, or unable to sleep) Only a little      (!) STRESS CONCERN      8/19/2024   Nutrition   Diet: Diabetic    Carbohydrate counting       Multiple values from one day are sorted in reverse-chronological order         8/19/2024   Exercise   Days per week of moderate/strenous exercise 2 days    Average minutes spent exercising at this level 30 min      (!) EXERCISE CONCERN      8/19/2024   Social Factors   Frequency of gathering with friends or relatives Once a week   Worry food won't last until get money to buy more No   Food not last or not have enough money for food? No   Do you have housing? (Housing is defined as stable permanent housing and does not include staying ouside in a car, in a tent, in an abandoned building, in an overnight shelter, or couch-surfing.) Yes   Are you worried about losing your housing? No   Lack of transportation? No   Unable to get utilities (heat,electricity)? No            8/19/2024   Fall Risk   Fallen 2 or more times in the past year? Yes   Trouble with walking or balance? Yes              8/19/2024   Activities of Daily Living- Home Safety   Needs help with the following daily activites None of the above   Safety concerns in the home None of the above            8/19/2024   Dental   Dentist two times every year? Yes            8/19/2024   Hearing Screening   Hearing concerns? (!) I NEED TO ASK PEOPLE TO SPEAK UP OR REPEAT THEMSELVES.            8/19/2024   Driving Risk Screening   Patient/family members have concerns about driving No            8/19/2024   General Alertness/Fatigue Screening   Have you been more tired than usual lately? No            8/19/2024   Urinary Incontinence Screening   Bothered by leaking urine in past 6 months Yes            8/19/2024   TB Screening   Were you born outside of the [place]? No            Today's PHQ-9 Score:       8/21/2024    10:51 AM   PHQ-9 SCORE   PHQ-9 Total Score 2           8/19/2024   Substance Use   Alcohol more than 3/day or more than 7/wk No   Do you have a current opioid prescription? No   How severe/bad is pain from 1 to 10? 1/10   Do you use any other substances recreationally? No        Social History     Tobacco Use    Smoking status: Former     Current packs/day: 0.00     Types: Cigarettes     Start date: 8/1/1968      Quit date: 2003     Years since quittin.1    Smokeless tobacco: Never   Vaping Use    Vaping status: Never Used   Substance Use Topics    Alcohol use: Yes     Comment: occasional    Drug use: No          Mammogram Screening - Mammogram every 1-2 years updated in Health Maintenance based on mutual decision making    ASCVD Risk   The 10-year ASCVD risk score (Jovani FAJARDO, et al., 2019) is: 34.5%    Values used to calculate the score:      Age: 73 years      Sex: Female      Is Non- : No      Diabetic: Yes      Tobacco smoker: No      Systolic Blood Pressure: 132 mmHg      Is BP treated: Yes      HDL Cholesterol: 76 mg/dL      Total Cholesterol: 172 mg/dL    Scheduled.         Reviewed and updated as needed this visit by Provider                    Lab work is in process  Current providers sharing in care for this patient include:  Patient Care Team:  Bibi Orta MD as PCP - General (Family Practice)  Selena Bruner PA-C as Physician Assistant (Urology)  No Ref-Primary, Physician  Selena Bruner PA-C as Assigned Surgical Provider  Cristina Chisholm PA-C as Assigned PCP    The following health maintenance items are reviewed in Epic and correct as of today:  Health Maintenance   Topic Date Due    DEXA  Never done    MEDICARE ANNUAL WELLNESS VISIT  Never done    DIABETIC FOOT EXAM  2019    A1C  2023    COVID-19 Vaccine (2023- season) 2023    Medicare Annual MTM Pharmacist Visit (once per calendar year)  Never done    BMP  2024    LIPID  2024    MICROALBUMIN  2024    HEMOGLOBIN  2024    INFLUENZA VACCINE (1) 2024    PHQ-9  2024    EYE EXAM  2025    ANNUAL REVIEW OF HM ORDERS  2025    COLORECTAL CANCER SCREENING  2025    ADVANCE CARE PLANNING  2025    FALL RISK ASSESSMENT  2025    MAMMO SCREENING  2025    DTAP/TDAP/TD IMMUNIZATION (5 - Td or Tdap)  "08/17/2032    HEPATITIS C SCREENING  Completed    DEPRESSION ACTION PLAN  Completed    Pneumococcal Vaccine: 65+ Years  Completed    URINALYSIS  Completed    ZOSTER IMMUNIZATION  Completed    RSV VACCINE (Pregnancy & 60+)  Completed    HPV IMMUNIZATION  Aged Out    MENINGITIS IMMUNIZATION  Aged Out    RSV MONOCLONAL ANTIBODY  Aged Out         Review of Systems  Constitutional, neuro, ENT, endocrine, pulmonary, cardiac, gastrointestinal, genitourinary, musculoskeletal, integument and psychiatric systems are negative, except as otherwise noted.     Objective    Exam  /70   Pulse 74   Temp 97.8  F (36.6  C) (Oral)   Resp 12   Ht 1.626 m (5' 4\")   Wt 95.3 kg (210 lb)   SpO2 95%   BMI 36.05 kg/m     Estimated body mass index is 34.78 kg/m  as calculated from the following:    Height as of 5/7/24: 1.651 m (5' 5\").    Weight as of 5/7/24: 94.8 kg (209 lb).    Physical Exam  GENERAL: alert and no distress  HENT: ear canals and TM's normal, nose and mouth without ulcers or lesions  RESP: lungs clear to auscultation - no rales, rhonchi or wheezes  CV: regular rate and rhythm, normal S1 S2, no S3 or S4, no murmur, click or rub, no peripheral edema   MS: no gross musculoskeletal defects noted, no edema  NEURO: Normal strength and tone, mentation intact and speech normal  PSYCH: mentation appears normal, affect normal/bright  Diabetic foot exam: normal DP and PT pulses and no trophic changes or ulcerative lesions        8/21/2024   Mini Cog   Clock Draw Score 2 Normal    0 Abnormal   3 Item Recall 3 objects recalled    2 objects recalled   Mini Cog Total Score 5    2       Multiple values from one day are sorted in reverse-chronological order              Signed Electronically by: Cristina Chisholm PA-C    "

## 2024-08-22 ENCOUNTER — TELEPHONE (OUTPATIENT)
Dept: FAMILY MEDICINE | Facility: CLINIC | Age: 74
End: 2024-08-22
Payer: COMMERCIAL

## 2024-08-22 ENCOUNTER — PATIENT OUTREACH (OUTPATIENT)
Dept: GASTROENTEROLOGY | Facility: CLINIC | Age: 74
End: 2024-08-22
Payer: COMMERCIAL

## 2024-08-22 DIAGNOSIS — Z12.11 SPECIAL SCREENING FOR MALIGNANT NEOPLASMS, COLON: Primary | ICD-10-CM

## 2024-08-22 NOTE — TELEPHONE ENCOUNTER
MIGUELITO referral from: [facility] visit (referral by provider)    [facility] referral outreach attempt #1 on August 22, 2024 at 2:29 PM      Outcome: Spoke with patient declined    Use media part d map for the carrier/Plan on the flowsheet          MIGUELITO Rain   [phone]

## 2024-08-22 NOTE — PROGRESS NOTES
"CRC Screening Colonoscopy Referral Review    Patient meets the inclusion criteria for screening colonoscopy standing order.    Ordering/Referring Provider: MILAGRO Barajas      BMI: Estimated body mass index is 36.05 kg/m  as calculated from the following:    Height as of 8/21/24: 1.626 m (5' 4\").    Weight as of 8/21/24: 95.3 kg (210 lb).     Sedation:  Does patient have any of the following conditions affecting sedation?  No medical conditions affecting sedation.    Previous Scopes:  Any previous recommendations or follow up needs based on previous scope?  na / No recommendations.    Medical Concerns to Postpone Order:  Does patient have any of the following medical concerns that should postpone/delay colonoscopy referral?  No medical conditions affecting colonoscopy referral.    Final Referral Details:  Based on patient's medical history patient is appropriate for referral order with moderate sedation. If patient's BMI > 50 do not schedule in ASC.  "

## 2024-09-04 ENCOUNTER — ANCILLARY PROCEDURE (OUTPATIENT)
Dept: BONE DENSITY | Facility: CLINIC | Age: 74
End: 2024-09-04
Attending: PHYSICIAN ASSISTANT
Payer: COMMERCIAL

## 2024-09-04 DIAGNOSIS — Z78.0 ASYMPTOMATIC POSTMENOPAUSAL STATUS: ICD-10-CM

## 2024-09-04 PROCEDURE — 77080 DXA BONE DENSITY AXIAL: CPT | Mod: TC | Performed by: PHYSICIAN ASSISTANT

## 2024-09-05 PROBLEM — M85.89 OSTEOPENIA OF MULTIPLE SITES: Status: ACTIVE | Noted: 2024-09-05

## 2024-09-10 ENCOUNTER — TELEPHONE (OUTPATIENT)
Dept: GASTROENTEROLOGY | Facility: CLINIC | Age: 74
End: 2024-09-10
Payer: COMMERCIAL

## 2024-09-10 DIAGNOSIS — Z12.11 SPECIAL SCREENING FOR MALIGNANT NEOPLASMS, COLON: Primary | ICD-10-CM

## 2024-09-10 NOTE — TELEPHONE ENCOUNTER
"Endoscopy Scheduling Screen    Have you had a positive Covid test in the last 14 days?  No    What is your communication preference for Instructions and/or Bowel Prep?   MyChart    What insurance is in the chart?  Other:  medica    Ordering/Referring Provider:     JESSICA GIRON      (If ordering provider performs procedure, schedule with ordering provider unless otherwise instructed. )    BMI: Estimated body mass index is 36.05 kg/m  as calculated from the following:    Height as of 8/21/24: 1.626 m (5' 4\").    Weight as of 8/21/24: 95.3 kg (210 lb).     Sedation Ordered  moderate sedation.   If patient BMI > 50 do not schedule in ASC.    If patient BMI > 45 do not schedule at [facility].    Are you taking methadone or Suboxone?  No    Have you had difficulties, pain, or discomfort during past endoscopy procedures?  No    Are you taking any prescription medications for pain 3 or more times per week?   NO, No RN review required.    Do you have a history of malignant hyperthermia?  No    (Females) Are you currently pregnant?   No     Have you been diagnosed or told you have pulmonary hypertension?   No    Do you have an LVAD?  No    Have you been told you have moderate to severe sleep apnea?  No    Have you been told you have COPD, asthma, or any other lung disease?  No    Do you have any heart conditions?  No     Have you ever had or are you waiting for an organ transplant?  No. Continue scheduling, no site restrictions.    Have you had a stroke or transient ischemic attack (TIA aka \"mini stroke\" in the last 6 months?   No    Have you been diagnosed with or been told you have cirrhosis of the liver?   No    Are you currently on dialysis?   No    Do you need assistance transferring?   No    BMI: Estimated body mass index is 36.05 kg/m  as calculated from the following:    Height as of 8/21/24: 1.626 m (5' 4\").    Weight as of 8/21/24: 95.3 kg (210 lb).     Is patients BMI > 40 and scheduling location [facility]?  No    Do you " take an injectable medication for weight loss or diabetes (excluding insulin)?  No    Do you take the medication Naltrexone?  No    Do you take blood thinners?  No       Prep   Are you currently on dialysis or do you have chronic kidney disease?  No    Do you have a diagnosis of diabetes?  Yes (Golytely Prep)    Do you have a diagnosis of cystic fibrosis (CF)?  No    On a regular basis do you go 3 -5 days between bowel movements?  No    BMI > 40?  No    Preferred Pharmacy:        [id number] - [place], [place] - [address]  [id number] [facility] [address]  Phone: [phone] Fax: [phone]        Final Scheduling Details     Procedure scheduled  Colonoscopy    Surgeon:  Eric     Date of procedure:  10/8     Pre-OP / PAC:   No - Not required for this site.    Location  [facility] - Patient preference.    Sedation   Moderate Sedation - Per order.      Patient Reminders:   You will receive a call from a Nurse to review instructions and health history.  This assessment must be completed prior to your procedure.  Failure to complete the Nurse assessment may result in the procedure being cancelled.      On the day of your procedure, please designate an adult(s) who can drive you home stay with you for the next 24 hours. The medicines used in the exam will make you sleepy. You will not be able to drive.      You cannot take public transportation, ride share services, or non-medical taxi service without a responsible caregiver.  Medical transport services are allowed with the requirement that a responsible caregiver will receive you at your destination.  We require that drivers and caregivers are confirmed prior to your procedure.

## 2024-09-17 RX ORDER — BISACODYL 5 MG/1
TABLET, DELAYED RELEASE ORAL
Qty: 4 TABLET | Refills: 0 | Status: SHIPPED | OUTPATIENT
Start: 2024-09-17

## 2024-09-17 NOTE — TELEPHONE ENCOUNTER
Standard Golytely Bowel Prep recommended due to CKD noted.  and diabetes.  Instructions were sent via [employer]. Bowel prep was sent 9/17/2024 to    [id number] - [place] - [address]

## 2024-09-26 ENCOUNTER — TELEPHONE (OUTPATIENT)
Dept: GASTROENTEROLOGY | Facility: CLINIC | Age: 74
End: 2024-09-26
Payer: COMMERCIAL

## 2024-09-26 NOTE — TELEPHONE ENCOUNTER
Pre assessment completed for upcoming procedure.      Procedure details:    Patient scheduled for Colonoscopy on 10/8/24.     Arrival time: 0645. Procedure time 0730    Facility location: [facility]; [address] [place] [address]. Check in location: Main entrance, door #1 on the North side of the building under roundabout awning. DO NOT GO TO SURGERY/ED ENTRANCE.     Sedation type: Conscious sedation     Pre op exam needed? No.    Indication for procedure: hx of polyps     Designated  policy reviewed. Instructed to have someone stay 6 hours post procedure.       Chart review:     Electronic implanted devices? No    Recent diagnosis of diverticulitis within the last 6 weeks?  No      Medication review:    Diabetic? Yes. Diabetic medication HOLDING recommendations: Oral diabetic medications: Jardiance (empagliflozin): HOLD  3 days before procedure.  Metformin (glucophage): HOLD day of procedure.    Anticoagulants? No    Weight loss medication/injectable? Phentermine: HOLD 7 days before procedure.- patient states they do not take this.     Other medication HOLDING recommendations:  N/A      Prep for procedure:     Bowel prep recommendation: Low Volume Extended Golytely.   Due to:  patient with double prep in the past. States they have to take fiber and use stool softeners to help with regularity. Standard Golytely sent by CRC team. Patient has picked up bowel prep from pharmacy. Plan is to switch low volume Golytely extended prep to ensure proper clean out. Patient declined miralax being send to pharmacy. States they will get OTC.     Prep instructions sent via [employer]- Updated.      Reviewed procedure prep instructions.     Patient verbalized understanding and had no questions or concerns at this time.        Vannessa Crabtree RN  Endoscopy Procedure Pre Assessment   [phone] option 2

## 2024-10-08 ENCOUNTER — HOSPITAL ENCOUNTER (OUTPATIENT)
Facility: CLINIC | Age: 74
Discharge: HOME | End: 2024-10-08
Attending: COLON & RECTAL SURGERY | Admitting: COLON & RECTAL SURGERY
Payer: COMMERCIAL

## 2024-10-08 VITALS
DIASTOLIC BLOOD PRESSURE: 70 MMHG | HEIGHT: 64 IN | OXYGEN SATURATION: 95 % | RESPIRATION RATE: 16 BRPM | BODY MASS INDEX: 35.68 KG/M2 | WEIGHT: 209 LBS | SYSTOLIC BLOOD PRESSURE: 134 MMHG | HEART RATE: 66 BPM

## 2024-10-08 LAB
COLONOSCOPY: NORMAL
GLUCOSE BLDC GLUCOMTR-MCNC: 99 MG/DL (ref 70–99)

## 2024-10-08 PROCEDURE — 88305 TISSUE EXAM BY PATHOLOGIST: CPT | Mod: TC | Performed by: COLON & RECTAL SURGERY

## 2024-10-08 PROCEDURE — 45385 COLONOSCOPY W/LESION REMOVAL: CPT | Mod: PT | Performed by: COLON & RECTAL SURGERY

## 2024-10-08 PROCEDURE — 250N000011 HC RX IP 250 OP 636: Performed by: COLON & RECTAL SURGERY

## 2024-10-08 PROCEDURE — 99153 MOD SED SAME PHYS/QHP EA: CPT | Performed by: COLON & RECTAL SURGERY

## 2024-10-08 PROCEDURE — G0500 MOD SEDAT ENDO SERVICE >5YRS: HCPCS | Performed by: COLON & RECTAL SURGERY

## 2024-10-08 PROCEDURE — 82962 GLUCOSE BLOOD TEST: CPT

## 2024-10-08 RX ORDER — FLUMAZENIL 0.1 MG/ML
0.2 INJECTION, SOLUTION INTRAVENOUS
Status: DISCONTINUED | OUTPATIENT
Start: 2024-10-08 | End: 2024-10-08 | Stop reason: HOSPADM

## 2024-10-08 RX ORDER — NALOXONE HYDROCHLORIDE 0.4 MG/ML
0.4 INJECTION, SOLUTION INTRAMUSCULAR; INTRAVENOUS; SUBCUTANEOUS
Status: DISCONTINUED | OUTPATIENT
Start: 2024-10-08 | End: 2024-10-08 | Stop reason: HOSPADM

## 2024-10-08 RX ORDER — PROCHLORPERAZINE MALEATE 5 MG/1
5 TABLET ORAL EVERY 6 HOURS PRN
Status: DISCONTINUED | OUTPATIENT
Start: 2024-10-08 | End: 2024-10-08 | Stop reason: HOSPADM

## 2024-10-08 RX ORDER — ATROPINE SULFATE 0.1 MG/ML
1 INJECTION INTRAVENOUS
Status: DISCONTINUED | OUTPATIENT
Start: 2024-10-08 | End: 2024-10-08 | Stop reason: HOSPADM

## 2024-10-08 RX ORDER — ONDANSETRON 2 MG/ML
4 INJECTION INTRAMUSCULAR; INTRAVENOUS
Status: DISCONTINUED | OUTPATIENT
Start: 2024-10-08 | End: 2024-10-08 | Stop reason: HOSPADM

## 2024-10-08 RX ORDER — NALOXONE HYDROCHLORIDE 0.4 MG/ML
0.2 INJECTION, SOLUTION INTRAMUSCULAR; INTRAVENOUS; SUBCUTANEOUS
Status: DISCONTINUED | OUTPATIENT
Start: 2024-10-08 | End: 2024-10-08 | Stop reason: HOSPADM

## 2024-10-08 RX ORDER — ONDANSETRON 2 MG/ML
4 INJECTION INTRAMUSCULAR; INTRAVENOUS EVERY 6 HOURS PRN
Status: DISCONTINUED | OUTPATIENT
Start: 2024-10-08 | End: 2024-10-08 | Stop reason: HOSPADM

## 2024-10-08 RX ORDER — DIPHENHYDRAMINE HYDROCHLORIDE 50 MG/ML
25-50 INJECTION INTRAMUSCULAR; INTRAVENOUS
Status: DISCONTINUED | OUTPATIENT
Start: 2024-10-08 | End: 2024-10-08 | Stop reason: HOSPADM

## 2024-10-08 RX ORDER — SIMETHICONE 40MG/0.6ML
133 SUSPENSION, DROPS(FINAL DOSAGE FORM)(ML) ORAL
Status: DISCONTINUED | OUTPATIENT
Start: 2024-10-08 | End: 2024-10-08 | Stop reason: HOSPADM

## 2024-10-08 RX ORDER — ONDANSETRON 4 MG/1
4 TABLET, ORALLY DISINTEGRATING ORAL EVERY 6 HOURS PRN
Status: DISCONTINUED | OUTPATIENT
Start: 2024-10-08 | End: 2024-10-08 | Stop reason: HOSPADM

## 2024-10-08 RX ORDER — FENTANYL CITRATE 50 UG/ML
50-100 INJECTION, SOLUTION INTRAMUSCULAR; INTRAVENOUS EVERY 5 MIN PRN
Status: DISCONTINUED | OUTPATIENT
Start: 2024-10-08 | End: 2024-10-08 | Stop reason: HOSPADM

## 2024-10-08 RX ORDER — EPINEPHRINE 1 MG/ML
0.1 INJECTION, SOLUTION, CONCENTRATE INTRAVENOUS
Status: DISCONTINUED | OUTPATIENT
Start: 2024-10-08 | End: 2024-10-08 | Stop reason: HOSPADM

## 2024-10-08 RX ORDER — LIDOCAINE 40 MG/G
CREAM TOPICAL
Status: DISCONTINUED | OUTPATIENT
Start: 2024-10-08 | End: 2024-10-08 | Stop reason: HOSPADM

## 2024-10-08 RX ADMIN — FENTANYL CITRATE 100 MCG: 50 INJECTION, SOLUTION INTRAMUSCULAR; INTRAVENOUS at 07:38

## 2024-10-08 RX ADMIN — MIDAZOLAM 1 MG: 1 INJECTION INTRAMUSCULAR; INTRAVENOUS at 07:45

## 2024-10-08 RX ADMIN — MIDAZOLAM 2 MG: 1 INJECTION INTRAMUSCULAR; INTRAVENOUS at 07:38

## 2024-10-08 ASSESSMENT — ACTIVITIES OF DAILY LIVING (ADL)
ADLS_ACUITY_SCORE: 38
ADLS_ACUITY_SCORE: 38

## 2024-10-08 NOTE — H&P
Pre-Endoscopy History and Physical     David Mckeon MRN# [id number]   YOB: 1950 Age: 73 year old     Date of Procedure: 10/8/2024  Primary care provider: Cristina Chisholm  Type of Endoscopy: colonoscopy  Reason for Procedure:   Type of Anesthesia Anticipated: Moderate Sedation    HPI:    David is a 73 year old female who will be undergoing the above procedure.      A history and physical has been performed. The patient's medications and allergies have been reviewed. The risks and benefits of the procedure and the sedation options and risks were discussed with the patient.  All questions were answered and informed consent was obtained.      She denies a personal or family history of anesthesia complications or bleeding disorders.     Allergies   Allergen Reactions    Aleve [Naproxen] Nausea    Ibuprofen Nausea    Lisinopril Cough     DRY COUGH        Prior to Admission Medications   Prescriptions Last Dose Informant Patient Reported? Taking?   Calcium Polycarbophil (FIBER-CAPS PO) 10/7/2024  Yes Yes   Sig: Take 1,500 mg by mouth 2 times daily 1.5 PILLS IN THE am AND 1 PILL IN THE pm   Cholecalciferol (VITAMIN D3) 2000 UNITS CAPS 10/7/2024  Yes Yes   Sig: Take 1 capsule by mouth 2 times daily    FLUoxetine (PROZAC) 20 MG capsule 10/7/2024  No Yes   Sig: Take 1 capsule (20 mg) by mouth daily   amLODIPine (NORVASC) 10 MG tablet 10/7/2024  No Yes   Sig: Take 1 tablet (10 mg) by mouth daily   aspirin (ASA) 81 MG EC tablet 10/7/2024  Yes Yes   Sig: Take 81 mg by mouth daily   buPROPion (WELLBUTRIN XL) 300 MG 24 hr tablet 10/7/2024  Yes Yes   empagliflozin (JARDIANCE) 25 MG TABS tablet Past Week  Yes Yes   losartan-hydrochlorothiazide (HYZAAR) 100-25 MG tablet 10/7/2024  No Yes   Sig: Take 1 tablet by mouth daily   metFORMIN (GLUCOPHAGE) 1000 MG tablet 10/7/2024  Yes Yes   Sig: Take 1,000 mg by mouth 2 times daily (with meals)   multivitamin, therapeutic with minerals (THERA-VIT-M) TABS 10/7/2024   Yes Yes   Sig: Take 1 tablet by mouth daily   phentermine (ADIPEX-P) 37.5 MG tablet   Yes No   Sig: Take 37.5 mg by mouth every morning (before breakfast)   Patient not taking: Reported on 2024   rosuvastatin (CRESTOR) 10 MG tablet 10/7/2024  Yes Yes   Sig: Take 1 tablet (10 mg) by mouth daily   traZODone (DESYREL) 50 MG tablet 10/7/2024  No Yes   Sig: Take 1-2 tablets ( mg) by mouth at bedtime      Facility-Administered Medications: None       Patient Active Problem List   Diagnosis    History of colonic polyps    CARDIOVASCULAR SCREENING; LDL GOAL LESS THAN 130    Mild major depression (H)    Hyperlipidemia with target LDL less than 100    Diverticulosis of large intestine without hemorrhage    Hemorrhoids    S/P colonoscopy with polypectomy    Type 2 diabetes mellitus with stage 3a chronic kidney disease, without long-term current use of insulin (H)    Essential hypertension, benign    S/P total knee replacement using cement, right    Stage 3a chronic kidney disease (H)    Anxiety    Class 2 severe obesity due to excess calories with serious comorbidity in adult (H)    Osteopenia of multiple sites        Past Medical History:   Diagnosis Date    High cholesterol     Hypertension     Mild major depression (H) 2014    Osteoarthritis     right knee    Stage 3a chronic kidney disease (H) 2023    Type 2 diabetes mellitus (H)         Past Surgical History:   Procedure Laterality Date    ARTHROPLASTY KNEE Right 2016    Procedure: ARTHROPLASTY KNEE;  Surgeon: Pankaj Schuster MD;  Location: [facility] OR    CARPAL TUNNEL RELEASE RT/LT Bilateral     COLONOSCOPY  2015    EYE SURGERY      glasses    SOFT TISSUE SURGERY      carpal tunnel-both hands    STAPEDECTOMY Bilateral        Social History     Tobacco Use    Smoking status: Former     Current packs/day: 0.00     Types: Cigarettes     Start date: 1968     Quit date: 2003     Years since quittin.3    Smokeless tobacco: Never  "  Substance Use Topics    Alcohol use: Yes     Comment: occasional       Family History   Problem Relation Age of Onset    Sudden Death Mother         MVA    Cerebrovascular Disease Father     Myocardial Infarction Maternal Grandmother     Cancer Maternal Grandfather         Leukemia    Myocardial Infarction Maternal Grandfather     Asthma Paternal Grandmother     Myocardial Infarction Paternal Grandmother     Myocardial Infarction Paternal Grandfather     Heart Disease Brother         Pacemaker    High cholesterol Brother     Hearing Loss Brother     High cholesterol Sister     Heart Disease Sister         Pacemaker    Blood Disease Sister     Arthritis Sister     Cancer Sister     High cholesterol Sister     Psoriasis Son     Crohn's Disease Son     Psoriasis Daughter     Cancer Sister         breast cancer, both sisters    Coronary Artery Disease Sister         Congestive heart failure    Breast Cancer Sister         masectomy    Vision Loss Sister     Coronary Artery Disease Brother         Congestive heart failure    Arthritis Brother     Heart Disease Brother     Nephrolithiasis Brother     Hearing Loss Brother     Breast Cancer Sister         radiation/chemo    Cancer Sister     Hypertension No family hx of        REVIEW OF SYSTEMS:     5 point ROS negative except as noted above in HPI, including Gen., Resp., CV, GI &  system review.      PHYSICAL EXAM:   BP (!) 145/64   Pulse 69   Resp 14   Ht 1.626 m (5' 4\")   Wt 94.8 kg (209 lb)   SpO2 96%   BMI 35.87 kg/m   Estimated body mass index is 35.87 kg/m  as calculated from the following:    Height as of this encounter: 1.626 m (5' 4\").    Weight as of this encounter: 94.8 kg (209 lb).   GENERAL APPEARANCE: healthy and alert  MENTAL STATUS: alert  AIRWAY EXAM: Mallampatti Class II (visualization of the soft palate, fauces, and uvula)  RESP: lungs clear to auscultation - no rales, rhonchi or wheezes  CV: regular rates and rhythm      DIAGNOSTICS:    Not " indicated      IMPRESSION   ASA Class 2 - Mild systemic disease        PLAN:       Plan for colonoscopy. We discussed the risks, benefits and alternatives and the patient wished to proceed.    The above has been forwarded to the consulting provider.      Signed Electronically by: Apoorva Reyes MD  October 8, 2024

## 2024-10-09 LAB
PATH REPORT.COMMENTS IMP SPEC: NORMAL
PATH REPORT.COMMENTS IMP SPEC: NORMAL
PATH REPORT.FINAL DX SPEC: NORMAL
PATH REPORT.GROSS SPEC: NORMAL
PATH REPORT.MICROSCOPIC SPEC OTHER STN: NORMAL
PATH REPORT.RELEVANT HX SPEC: NORMAL
PHOTO IMAGE: NORMAL

## 2024-10-09 PROCEDURE — 88305 TISSUE EXAM BY PATHOLOGIST: CPT | Mod: 26 | Performed by: PATHOLOGY

## 2024-10-10 ASSESSMENT — ANXIETY QUESTIONNAIRES: GAD7 TOTAL SCORE: 17

## 2024-10-11 ASSESSMENT — ANXIETY QUESTIONNAIRES: GAD7 TOTAL SCORE: 1

## 2024-11-21 ENCOUNTER — TELEPHONE (OUTPATIENT)
Dept: FAMILY MEDICINE | Facility: CLINIC | Age: 74
End: 2024-11-21
Payer: COMMERCIAL

## 2024-11-21 NOTE — TELEPHONE ENCOUNTER
Patient Quality Outreach    Patient is due for the following:   There are no preventive care reminders to display for this patient.    Action(s) Taken:   Schedule a nurse only visit for vaccines    Type of outreach:    Chart review performed, no outreach needed. Flu vaccine date found on care everywhere    Questions for provider review:    None           Josefa Hanley, CMA

## 2024-12-28 ASSESSMENT — ANXIETY QUESTIONNAIRES
7. FEELING AFRAID AS IF SOMETHING AWFUL MIGHT HAPPEN: NOT AT ALL
3. WORRYING TOO MUCH ABOUT DIFFERENT THINGS: SEVERAL DAYS
2. NOT BEING ABLE TO STOP OR CONTROL WORRYING: NOT AT ALL
GAD7 TOTAL SCORE: 2
6. BECOMING EASILY ANNOYED OR IRRITABLE: NOT AT ALL
GAD7 TOTAL SCORE: 2
4. TROUBLE RELAXING: NOT AT ALL
GAD7 TOTAL SCORE: 2
IF YOU CHECKED OFF ANY PROBLEMS ON THIS QUESTIONNAIRE, HOW DIFFICULT HAVE THESE PROBLEMS MADE IT FOR YOU TO DO YOUR WORK, TAKE CARE OF THINGS AT HOME, OR GET ALONG WITH OTHER PEOPLE: SOMEWHAT DIFFICULT
8. IF YOU CHECKED OFF ANY PROBLEMS, HOW DIFFICULT HAVE THESE MADE IT FOR YOU TO DO YOUR WORK, TAKE CARE OF THINGS AT HOME, OR GET ALONG WITH OTHER PEOPLE?: SOMEWHAT DIFFICULT
7. FEELING AFRAID AS IF SOMETHING AWFUL MIGHT HAPPEN: NOT AT ALL
1. FEELING NERVOUS, ANXIOUS, OR ON EDGE: SEVERAL DAYS
5. BEING SO RESTLESS THAT IT IS HARD TO SIT STILL: NOT AT ALL

## 2024-12-30 ENCOUNTER — OFFICE VISIT (OUTPATIENT)
Dept: FAMILY MEDICINE | Facility: CLINIC | Age: 74
End: 2024-12-30
Payer: COMMERCIAL

## 2024-12-30 VITALS
TEMPERATURE: 98.3 F | DIASTOLIC BLOOD PRESSURE: 72 MMHG | HEIGHT: 65 IN | SYSTOLIC BLOOD PRESSURE: 132 MMHG | OXYGEN SATURATION: 99 % | BODY MASS INDEX: 35.82 KG/M2 | HEART RATE: 80 BPM | WEIGHT: 215 LBS | RESPIRATION RATE: 16 BRPM

## 2024-12-30 DIAGNOSIS — I10 HTN, GOAL BELOW 140/90: ICD-10-CM

## 2024-12-30 DIAGNOSIS — E11.22 TYPE 2 DIABETES MELLITUS WITH STAGE 3A CHRONIC KIDNEY DISEASE, WITHOUT LONG-TERM CURRENT USE OF INSULIN (H): Primary | ICD-10-CM

## 2024-12-30 DIAGNOSIS — F33.1 MODERATE EPISODE OF RECURRENT MAJOR DEPRESSIVE DISORDER (H): ICD-10-CM

## 2024-12-30 DIAGNOSIS — E78.5 HYPERLIPIDEMIA WITH TARGET LDL LESS THAN 100: ICD-10-CM

## 2024-12-30 DIAGNOSIS — F41.9 ANXIETY: ICD-10-CM

## 2024-12-30 DIAGNOSIS — N18.31 TYPE 2 DIABETES MELLITUS WITH STAGE 3A CHRONIC KIDNEY DISEASE, WITHOUT LONG-TERM CURRENT USE OF INSULIN (H): Primary | ICD-10-CM

## 2024-12-30 LAB
EST. AVERAGE GLUCOSE BLD GHB EST-MCNC: 126 MG/DL
HBA1C MFR BLD: 6 % (ref 0–5.6)

## 2024-12-30 PROCEDURE — 99214 OFFICE O/P EST MOD 30 MIN: CPT

## 2024-12-30 PROCEDURE — G2211 COMPLEX E/M VISIT ADD ON: HCPCS

## 2024-12-30 PROCEDURE — 83036 HEMOGLOBIN GLYCOSYLATED A1C: CPT

## 2024-12-30 PROCEDURE — 36415 COLL VENOUS BLD VENIPUNCTURE: CPT

## 2024-12-30 RX ORDER — TRAZODONE HYDROCHLORIDE 50 MG/1
50 TABLET, FILM COATED ORAL AT BEDTIME
Qty: 90 TABLET | Refills: 1 | Status: SHIPPED | OUTPATIENT
Start: 2024-12-30

## 2024-12-30 RX ORDER — BUPROPION HYDROCHLORIDE 300 MG/1
300 TABLET ORAL EVERY MORNING
Qty: 90 TABLET | Refills: 1 | Status: SHIPPED | OUTPATIENT
Start: 2024-12-30

## 2024-12-30 RX ORDER — LOSARTAN POTASSIUM AND HYDROCHLOROTHIAZIDE 25; 100 MG/1; MG/1
1 TABLET ORAL DAILY
Qty: 90 TABLET | Refills: 1 | Status: SHIPPED | OUTPATIENT
Start: 2024-12-30

## 2024-12-30 RX ORDER — AMLODIPINE BESYLATE 10 MG/1
10 TABLET ORAL DAILY
Qty: 90 TABLET | Refills: 1 | Status: SHIPPED | OUTPATIENT
Start: 2024-12-30

## 2024-12-30 RX ORDER — ROSUVASTATIN CALCIUM 10 MG/1
10 TABLET, COATED ORAL DAILY
Qty: 90 TABLET | Refills: 1 | Status: SHIPPED | OUTPATIENT
Start: 2024-12-30

## 2024-12-30 RX ORDER — CITALOPRAM HYDROBROMIDE 20 MG/1
20 TABLET ORAL DAILY
Qty: 90 TABLET | Refills: 0 | Status: SHIPPED | OUTPATIENT
Start: 2024-12-30

## 2024-12-30 ASSESSMENT — PATIENT HEALTH QUESTIONNAIRE - PHQ9: SUM OF ALL RESPONSES TO PHQ QUESTIONS 1-9: 14

## 2024-12-30 NOTE — PROGRESS NOTES
Assessment & Plan     (E11.22,  N18.31) Type 2 diabetes mellitus with stage 3a chronic kidney disease, without long-term current use of insulin (H)  (primary encounter diagnosis)  Comment: Chronic medical condition due for monitoring; continue on present treatment management of Jardiance 25 mg daily and metformin 1000 mg BID for now and adjustment of therapy may be needed based on results.   Plan: empagliflozin (JARDIANCE) 25 MG TABS tablet,         metFORMIN (GLUCOPHAGE) 1000 MG tablet,         Hemoglobin A1c    (I10) HTN, goal below 140/90  Comment: Chronic stable medical condition; continue present management on current treatment regimen; refilled amlodipine and losartan-HCTZ.   Plan: amLODIPine (NORVASC) 10 MG tablet,         losartan-hydrochlorothiazide (HYZAAR) 100-25 MG        tablet    (F41.9) Anxiety  Comment: Chronic stable medical condition; continue present management on current treatment regimen of trazodone 50 mg nightly; refilled.   Plan: traZODone (DESYREL) 50 MG tablet    (F33.1) Moderate episode of recurrent major depressive disorder (H)  Comment: Chronic medical condition uncontrolled. Patient wanting to discontinue fluoxetine and switch to a different medication. Discussed options and patient is agreeable to trial citalopram as there is no need to cross taper. Follow-up in 6 weeks, sooner as needed. Refilled bupropion   Plan: buPROPion (WELLBUTRIN XL) 300 MG 24 hr tablet,         citalopram (CELEXA) 20 MG tablet    (E78.5) Hyperlipidemia with target LDL less than 100  Comment: Chronic stable medical condition; continue present management on current treatment regimen of rosuvastatin 10 mg daily; refilled.   Plan: rosuvastatin (CRESTOR) 10 MG tablet      The longitudinal plan of care for the diagnosis(es)/condition(s) as documented were addressed during this visit. Due to the added complexity in care, I will continue to support David in the subsequent management and with ongoing continuity of  "care.      Subjective   David is a 74 year old, presenting for the following health issues:  Recheck Medication        12/30/2024     3:44 PM   Additional Questions   Roomed by Courtney     History of Present Illness       Mental Health Follow-up:  Patient presents to follow-up on Depression & Anxiety.Patient's depression since last visit has been:  Better  The patient is not having other symptoms associated with depression.  Patient's anxiety since last visit has been:  Better  The patient is not having other symptoms associated with anxiety.  Any significant life events: other  Patient is not feeling anxious or having panic attacks.  Patient has no concerns about alcohol or drug use.    Diabetes:   She presents for follow up of diabetes.    She is not checking blood glucose.         She has no concerns regarding her diabetes at this time.   She is not experiencing numbness or burning in feet, excessive thirst, blurry vision, weight changes or redness, sores or blisters on feet. The patient has not had a diabetic eye exam in the last 12 months.          Hypertension: She presents for follow up of hypertension.  She does not check blood pressure  regularly outside of the clinic. Outpatient blood pressures have not been over 140/90. She does not follow a low salt diet.     She eats 0-1 servings of fruits and vegetables daily.She consumes 1 sweetened beverage(s) daily.She exercises with enough effort to increase her heart rate 9 or less minutes per day.  She exercises with enough effort to increase her heart rate 3 or less days per week.   She is taking medications regularly.       Objective    BP (!) 152/70 (BP Location: Right arm, Patient Position: Chair, Cuff Size: Adult Large)   Pulse 80   Temp 98.3  F (36.8  C) (Oral)   Resp 16   Ht 1.651 m (5' 5\")   Wt 97.5 kg (215 lb)   SpO2 99%   BMI 35.78 kg/m    Body mass index is 35.78 kg/m .  Physical Exam   GENERAL: alert and no distress  RESP: lungs clear to " auscultation - no rales, rhonchi or wheezes  CV: regular rate and rhythm, normal S1 S2, no S3 or S4, no murmur, click or rub, no peripheral edema          Signed Electronically by: ELVI Nguyen CNP

## 2025-03-16 ASSESSMENT — ANXIETY QUESTIONNAIRES
2. NOT BEING ABLE TO STOP OR CONTROL WORRYING: SEVERAL DAYS
1. FEELING NERVOUS, ANXIOUS, OR ON EDGE: SEVERAL DAYS
6. BECOMING EASILY ANNOYED OR IRRITABLE: NOT AT ALL
7. FEELING AFRAID AS IF SOMETHING AWFUL MIGHT HAPPEN: SEVERAL DAYS
GAD7 TOTAL SCORE: 4
3. WORRYING TOO MUCH ABOUT DIFFERENT THINGS: SEVERAL DAYS
IF YOU CHECKED OFF ANY PROBLEMS ON THIS QUESTIONNAIRE, HOW DIFFICULT HAVE THESE PROBLEMS MADE IT FOR YOU TO DO YOUR WORK, TAKE CARE OF THINGS AT HOME, OR GET ALONG WITH OTHER PEOPLE: SOMEWHAT DIFFICULT
GAD7 TOTAL SCORE: 4
GAD7 TOTAL SCORE: 4
4. TROUBLE RELAXING: NOT AT ALL
8. IF YOU CHECKED OFF ANY PROBLEMS, HOW DIFFICULT HAVE THESE MADE IT FOR YOU TO DO YOUR WORK, TAKE CARE OF THINGS AT HOME, OR GET ALONG WITH OTHER PEOPLE?: SOMEWHAT DIFFICULT
5. BEING SO RESTLESS THAT IT IS HARD TO SIT STILL: NOT AT ALL
7. FEELING AFRAID AS IF SOMETHING AWFUL MIGHT HAPPEN: SEVERAL DAYS

## 2025-03-16 ASSESSMENT — PATIENT HEALTH QUESTIONNAIRE - PHQ9
10. IF YOU CHECKED OFF ANY PROBLEMS, HOW DIFFICULT HAVE THESE PROBLEMS MADE IT FOR YOU TO DO YOUR WORK, TAKE CARE OF THINGS AT HOME, OR GET ALONG WITH OTHER PEOPLE: NOT DIFFICULT AT ALL
SUM OF ALL RESPONSES TO PHQ QUESTIONS 1-9: 5
SUM OF ALL RESPONSES TO PHQ QUESTIONS 1-9: 5

## 2025-03-17 ENCOUNTER — VIRTUAL VISIT (OUTPATIENT)
Dept: FAMILY MEDICINE | Facility: CLINIC | Age: 75
End: 2025-03-17
Payer: COMMERCIAL

## 2025-03-17 DIAGNOSIS — I10 ESSENTIAL HYPERTENSION, BENIGN: Primary | ICD-10-CM

## 2025-03-17 DIAGNOSIS — F33.1 MODERATE EPISODE OF RECURRENT MAJOR DEPRESSIVE DISORDER (H): ICD-10-CM

## 2025-03-17 PROCEDURE — 98006 SYNCH AUDIO-VIDEO EST MOD 30: CPT

## 2025-03-17 RX ORDER — CITALOPRAM HYDROBROMIDE 20 MG/1
20 TABLET ORAL DAILY
Qty: 90 TABLET | Refills: 2 | Status: SHIPPED | OUTPATIENT
Start: 2025-03-17

## 2025-03-17 NOTE — PROGRESS NOTES
"David is a 74 year old who is being evaluated via a billable video visit.          Assessment & Plan     Moderate episode of recurrent major depressive disorder (H)  - Doing well on current medication regimen, including citalopram and bupropion.  - Refill citalopram prescription through [facility]. Follow-up appointment scheduled for June 2025 to repeat hemoglobin A1c and other lab work.    Essential hypertension:  - Chronic stable medical condition. Patient checking at home and blood pressure today was at goal of <140/90 with a reading of 132/72. Follow-up June 2025.      BMI  Estimated body mass index is 35.78 kg/m  as calculated from the following:    Height as of 12/30/24: 1.651 m (5' 5\").    Weight as of 12/30/24: 97.5 kg (215 lb).     The longitudinal plan of care for the diagnosis(es)/condition(s) as documented were addressed during this visit. Due to the added complexity in care, I will continue to support David in the subsequent management and with ongoing continuity of care.    Rachelle Hernandez is a 74 year old, presenting for the following health issues:  Recheck Medication      Video Start Time:  1736    History of Present Illness       Mental Health Follow-up:  Patient presents to follow-up on Depression & Anxiety.Patient's depression since last visit has been:  Better  The patient is not having other symptoms associated with depression.  Patient's anxiety since last visit has been:  Better  The patient is not having other symptoms associated with anxiety.  Any significant life events: No  Patient is feeling anxious or having panic attacks.  Patient has no concerns about alcohol or drug use.    Diabetes:   She presents for follow up of diabetes.    She is not checking blood glucose.         She has no concerns regarding her diabetes at this time.  She is having weight gain.            Hyperlipidemia:  She presents for follow up of hyperlipidemia.   She is taking medication to lower cholesterol. " She is not having myalgia or other side effects to statin medications.    Hypertension: She presents for follow up of hypertension.  She does check blood pressure  regularly outside of the clinic. Outpatient blood pressures have not been over 140/90. She follows a low salt diet.     She eats 2-3 servings of fruits and vegetables daily.She consumes 0 sweetened beverage(s) daily.She exercises with enough effort to increase her heart rate 10 to 19 minutes per day.  She exercises with enough effort to increase her heart rate 5 days per week.   She is taking medications regularly.   - David Mane Mike, 74-year-old female.  - Medication switch to citalopram and bupropion with no reported problems, although she occasionally experiences days of low motivation.  - Blood pressure self-monitored at 137/72.        Objective    Vitals - Patient Reported  Systolic (Patient Reported): 132  Diastolic (Patient Reported): 72      Vitals:  No vitals were obtained today due to virtual visit.    Physical Exam   GENERAL: alert and no distress  EYES: Eyes grossly normal to inspection.  No discharge or erythema, or obvious scleral/conjunctival abnormalities.  RESP: No audible wheeze, cough, or visible cyanosis.    SKIN: Visible skin clear. No significant rash, abnormal pigmentation or lesions.  NEURO: Cranial nerves grossly intact.  Mentation and speech appropriate for age.  PSYCH: Appropriate affect, tone, and pace of words        Video-Visit Details    Type of service:  Video Visit   Video End Time: 1743  Originating Location (pt. Location): Home    Distant Location (provider location):  On-site  Platform used for Video Visit: Mary Ellen  Signed Electronically by: ELVI Nguyen CNP

## 2025-03-17 NOTE — PROGRESS NOTES
LVM requesting patient to call back to schedule follow up in person.     Anu Dawn [facility]

## 2025-06-13 ASSESSMENT — ANXIETY QUESTIONNAIRES
8. IF YOU CHECKED OFF ANY PROBLEMS, HOW DIFFICULT HAVE THESE MADE IT FOR YOU TO DO YOUR WORK, TAKE CARE OF THINGS AT HOME, OR GET ALONG WITH OTHER PEOPLE?: SOMEWHAT DIFFICULT
GAD7 TOTAL SCORE: 3
6. BECOMING EASILY ANNOYED OR IRRITABLE: NOT AT ALL
2. NOT BEING ABLE TO STOP OR CONTROL WORRYING: SEVERAL DAYS
IF YOU CHECKED OFF ANY PROBLEMS ON THIS QUESTIONNAIRE, HOW DIFFICULT HAVE THESE PROBLEMS MADE IT FOR YOU TO DO YOUR WORK, TAKE CARE OF THINGS AT HOME, OR GET ALONG WITH OTHER PEOPLE: SOMEWHAT DIFFICULT
3. WORRYING TOO MUCH ABOUT DIFFERENT THINGS: SEVERAL DAYS
7. FEELING AFRAID AS IF SOMETHING AWFUL MIGHT HAPPEN: NOT AT ALL
1. FEELING NERVOUS, ANXIOUS, OR ON EDGE: SEVERAL DAYS
4. TROUBLE RELAXING: NOT AT ALL
GAD7 TOTAL SCORE: 3
GAD7 TOTAL SCORE: 3
5. BEING SO RESTLESS THAT IT IS HARD TO SIT STILL: NOT AT ALL
7. FEELING AFRAID AS IF SOMETHING AWFUL MIGHT HAPPEN: NOT AT ALL

## 2025-06-17 ENCOUNTER — OFFICE VISIT (OUTPATIENT)
Dept: FAMILY MEDICINE | Facility: CLINIC | Age: 75
End: 2025-06-17
Payer: COMMERCIAL

## 2025-06-17 ENCOUNTER — RESULTS FOLLOW-UP (OUTPATIENT)
Dept: FAMILY MEDICINE | Facility: CLINIC | Age: 75
End: 2025-06-17

## 2025-06-17 VITALS
RESPIRATION RATE: 20 BRPM | BODY MASS INDEX: 35.75 KG/M2 | HEART RATE: 69 BPM | TEMPERATURE: 98 F | SYSTOLIC BLOOD PRESSURE: 138 MMHG | HEIGHT: 65 IN | DIASTOLIC BLOOD PRESSURE: 82 MMHG | WEIGHT: 214.6 LBS | OXYGEN SATURATION: 96 %

## 2025-06-17 DIAGNOSIS — E11.22 TYPE 2 DIABETES MELLITUS WITH STAGE 3A CHRONIC KIDNEY DISEASE, WITHOUT LONG-TERM CURRENT USE OF INSULIN (H): Primary | ICD-10-CM

## 2025-06-17 DIAGNOSIS — E78.5 HYPERLIPIDEMIA WITH TARGET LDL LESS THAN 100: ICD-10-CM

## 2025-06-17 DIAGNOSIS — I10 HTN, GOAL BELOW 140/90: ICD-10-CM

## 2025-06-17 DIAGNOSIS — G47.9 SLEEP DISTURBANCE: ICD-10-CM

## 2025-06-17 DIAGNOSIS — F41.9 ANXIETY: ICD-10-CM

## 2025-06-17 DIAGNOSIS — N18.31 STAGE 3A CHRONIC KIDNEY DISEASE (H): ICD-10-CM

## 2025-06-17 DIAGNOSIS — Z79.899 MEDICATION MANAGEMENT: ICD-10-CM

## 2025-06-17 DIAGNOSIS — F33.1 MODERATE EPISODE OF RECURRENT MAJOR DEPRESSIVE DISORDER (H): ICD-10-CM

## 2025-06-17 DIAGNOSIS — E66.812 CLASS 2 SEVERE OBESITY DUE TO EXCESS CALORIES WITH SERIOUS COMORBIDITY AND BODY MASS INDEX (BMI) OF 35.0 TO 35.9 IN ADULT (H): ICD-10-CM

## 2025-06-17 DIAGNOSIS — E66.01 CLASS 2 SEVERE OBESITY DUE TO EXCESS CALORIES WITH SERIOUS COMORBIDITY AND BODY MASS INDEX (BMI) OF 35.0 TO 35.9 IN ADULT (H): ICD-10-CM

## 2025-06-17 DIAGNOSIS — N18.31 TYPE 2 DIABETES MELLITUS WITH STAGE 3A CHRONIC KIDNEY DISEASE, WITHOUT LONG-TERM CURRENT USE OF INSULIN (H): Primary | ICD-10-CM

## 2025-06-17 LAB
EST. AVERAGE GLUCOSE BLD GHB EST-MCNC: 123 MG/DL
HBA1C MFR BLD: 5.9 % (ref 0–5.6)
HGB BLD-MCNC: 14.3 G/DL (ref 11.7–15.7)
HOLD SPECIMEN: NORMAL
HOLD SPECIMEN: NORMAL
MCV RBC AUTO: 90 FL (ref 78–100)

## 2025-06-17 PROCEDURE — 3079F DIAST BP 80-89 MM HG: CPT

## 2025-06-17 PROCEDURE — 83036 HEMOGLOBIN GLYCOSYLATED A1C: CPT

## 2025-06-17 PROCEDURE — G2211 COMPLEX E/M VISIT ADD ON: HCPCS

## 2025-06-17 PROCEDURE — 3044F HG A1C LEVEL LT 7.0%: CPT

## 2025-06-17 PROCEDURE — 85018 HEMOGLOBIN: CPT

## 2025-06-17 PROCEDURE — 36415 COLL VENOUS BLD VENIPUNCTURE: CPT

## 2025-06-17 PROCEDURE — 99214 OFFICE O/P EST MOD 30 MIN: CPT

## 2025-06-17 PROCEDURE — 3075F SYST BP GE 130 - 139MM HG: CPT

## 2025-06-17 RX ORDER — LOSARTAN POTASSIUM AND HYDROCHLOROTHIAZIDE 25; 100 MG/1; MG/1
1 TABLET ORAL DAILY
Qty: 90 TABLET | Refills: 1 | Status: SHIPPED | OUTPATIENT
Start: 2025-06-17

## 2025-06-17 RX ORDER — CITALOPRAM HYDROBROMIDE 40 MG/1
40 TABLET ORAL DAILY
Qty: 90 TABLET | Refills: 0 | Status: SHIPPED | OUTPATIENT
Start: 2025-06-17

## 2025-06-17 RX ORDER — BUPROPION HYDROCHLORIDE 300 MG/1
300 TABLET ORAL EVERY MORNING
Qty: 90 TABLET | Refills: 1 | Status: SHIPPED | OUTPATIENT
Start: 2025-06-17

## 2025-06-17 RX ORDER — AMLODIPINE BESYLATE 10 MG/1
10 TABLET ORAL DAILY
Qty: 90 TABLET | Refills: 1 | Status: SHIPPED | OUTPATIENT
Start: 2025-06-17

## 2025-06-17 RX ORDER — ROSUVASTATIN CALCIUM 10 MG/1
10 TABLET, COATED ORAL DAILY
Qty: 90 TABLET | Refills: 1 | Status: SHIPPED | OUTPATIENT
Start: 2025-06-17

## 2025-06-17 RX ORDER — TRAZODONE HYDROCHLORIDE 100 MG/1
100 TABLET ORAL AT BEDTIME
Qty: 90 TABLET | Refills: 1 | Status: SHIPPED | OUTPATIENT
Start: 2025-06-17

## 2025-06-17 ASSESSMENT — PATIENT HEALTH QUESTIONNAIRE - PHQ9
SUM OF ALL RESPONSES TO PHQ QUESTIONS 1-9: 5
10. IF YOU CHECKED OFF ANY PROBLEMS, HOW DIFFICULT HAVE THESE PROBLEMS MADE IT FOR YOU TO DO YOUR WORK, TAKE CARE OF THINGS AT HOME, OR GET ALONG WITH OTHER PEOPLE: SOMEWHAT DIFFICULT
SUM OF ALL RESPONSES TO PHQ QUESTIONS 1-9: 5

## 2025-06-17 ASSESSMENT — ENCOUNTER SYMPTOMS: NERVOUS/ANXIOUS: 1

## 2025-06-17 NOTE — PROGRESS NOTES
Assessment & Plan     (E11.22,  N18.31) Type 2 diabetes mellitus with stage 3a chronic kidney disease, without long-term current use of insulin (H)  (primary encounter diagnosis)  Comment: Chronic stable medical condition; continue present management on current treatment regimen of Jardiance 25 mg daily and metformin 1000 mg BID. A1c stable at 5.9. Follow-up in 6 months, sooner as needed.   Plan: HEMOGLOBIN A1C, empagliflozin (JARDIANCE) 25 MG        TABS tablet, metFORMIN (GLUCOPHAGE) 1000 MG         tablet    (Z79.899) Medication management  Plan: Med Therapy Management Referral    (N18.31) Stage 3a chronic kidney disease (H)  Comment: Will check hemoglobin today.  Plan: Hemoglobin    (I10) HTN, goal below 140/90  Comment: Chronic stable medical condition; continue present management on current treatment regimen of amlodipine 10 mg daily and losartan-hydrochlorothiazide 100-25 mg daily; refilled. Follow-up in 6 months, sooner as needed.   Plan: amLODIPine (NORVASC) 10 MG tablet,         losartan-hydrochlorothiazide (HYZAAR) 100-25 MG        tablet    (F33.1) Moderate episode of recurrent major depressive disorder (H)  (F41.9) Anxiety  Comment: Chronic medical condition uncontrolled.  Discussed treatment options and patient would like to increase her citalopram to 40 mg daily and continue her Wellbutrin 300 mg daily.  She will make a follow-up appointment if this is not effective for her.  Plan: citalopram (CELEXA) 40 MG tablet, buPROPion         (WELLBUTRIN XL) 300 MG 24 hr tablet    (E78.5) Hyperlipidemia with target LDL less than 100  Comment: Chronic stable medical condition; continue present management on current treatment regimen of rosuvastatin 10 mg daily.  Will recheck lipids in 6 months.  I have  Plan: rosuvastatin (CRESTOR) 10 MG tablet    (G47.9) Sleep disturbance  Comment: Chronic stable medical condition; continue present management on current treatment regimen of trazodone 100 mg nightly refill  "provided.   Plan: traZODone (DESYREL) 100 MG tablet    (E66.812,  E66.01,  Z68.35) Class 2 severe obesity due to excess calories with serious comorbidity and body mass index (BMI) of 35.0 to 35.9 in adult (H)  Comment: Recommended continuing to work on therapeutic lifestyle changes including a heart healthy diet, regular physical activity and weight management/loss efforts.        BMI  Estimated body mass index is 35.71 kg/m  as calculated from the following:    Height as of this encounter: 1.651 m (5' 5\").    Weight as of this encounter: 97.3 kg (214 lb 9.6 oz).     The longitudinal plan of care for the diagnosis(es)/condition(s) as documented were addressed during this visit. Due to the added complexity in care, I will continue to support David in the subsequent management and with ongoing continuity of care.    Rachelle Hernandez is a 74 year old, presenting for the following health issues:  Depression, Anxiety, and Diabetes        6/17/2025    10:52 AM   Additional Questions   Roomed by Shannon Olson     Anxiety    History of Present Illness       Mental Health Follow-up:  Patient presents to follow-up on Depression & Anxiety.Patient's depression since last visit has been:  Better  The patient is not having other symptoms associated with depression.  Patient's anxiety since last visit has been:  Better  The patient is not having other symptoms associated with anxiety.  Any significant life events: No  Patient is not feeling anxious or having panic attacks.  Patient has no concerns about alcohol or drug use.    Diabetes:   She presents for follow up of diabetes.    She is not checking blood glucose.         She has no concerns regarding her diabetes at this time.   She is not experiencing numbness or burning in feet, excessive thirst, blurry vision, weight changes or redness, sores or blisters on feet.           Hyperlipidemia:  She presents for follow up of hyperlipidemia.   She is taking medication to lower " "cholesterol. She is not having myalgia or other side effects to statin medications.    Hypertension: She presents for follow up of hypertension.  She does check blood pressure  regularly outside of the clinic. Outpatient blood pressures have not been over 140/90. She does not follow a low salt diet.     She eats 0-1 servings of fruits and vegetables daily.She consumes 1 sweetened beverage(s) daily.She exercises with enough effort to increase her heart rate 9 or less minutes per day.  She exercises with enough effort to increase her heart rate 3 or less days per week.   She is taking medications regularly.   David Mckeon, a 74-year-old female, reports feeling down and lacking motivation, which she attributes in part to her 's recent [facility] and constant presence at home. She finds it challenging to engage in activities she used to enjoy, such as writing letters. She denies any thoughts of self-harm.    She experiences stiffness and tingling in her legs upon waking, which improves after moving around for about an hour. She has arthritis but has not previously noticed pain or significant symptoms until recently. She does not have a history of low back problems. Her  suffers from neuropathy, which she is familiar with.    David mentions that her grandchildren, aged 13, 11, 10, and 7, will be visiting during the summer, which she looks forward to.          Objective    /82 (BP Location: Right arm, Patient Position: Sitting, Cuff Size: Adult Large)   Pulse 69   Temp 98  F (36.7  C) (Oral)   Resp 20   Ht 1.651 m (5' 5\")   Wt 97.3 kg (214 lb 9.6 oz)   SpO2 96%   BMI 35.71 kg/m    Body mass index is 35.71 kg/m .  Physical Exam   GENERAL: alert and no distress  RESP: lungs clear to auscultation - no rales, rhonchi or wheezes  CV: regular rate and rhythm, normal S1 S2, no S3 or S4, no murmur, click or rub, no peripheral edema  MS: no gross musculoskeletal defects noted, no edema        "     Signed Electronically by: ELVI Nguyen CNP

## 2025-06-17 NOTE — PATIENT INSTRUCTIONS
Voltaren Gel - 4 times daily (thin layer)    Serotonin syndrome symptoms usually occur within several hours of taking a new drug or increasing the dose of a drug you're already taking.  Signs and symptoms include:  Agitation or restlessness  Insomnia  Confusion  Rapid heart rate and high blood pressure  Dilated pupils  Loss of muscle coordination or twitching muscles  High blood pressure  Muscle rigidity  Heavy sweating  Diarrhea  Headache  Shivering  Goose bumps  Severe serotonin syndrome can be life-threatening. Signs include:  High fever  Tremor  Seizures  Irregular heartbeat  Unconsciousness

## 2025-06-20 NOTE — PROGRESS NOTES
Medication Therapy Management (MTM) Encounter    ASSESSMENT:                            Medication Adherence/Access: No issues identified.    Diabetes with CKD (CrCl 52.9 mL/min, eGFR 55 mL/min/1.73m2)  Stable, patient is meeting A1c goal of <7%.  Doses of medications are appropriate based on current kidney function.    Hypertension   Stable, blood pressure is at goal of <140/90 mmHg.    Hyperlipidemia   Stable, LDL is at goal of <100 mg/dL.     Mental Health (Depression, Anxiety, Insomnia)  Recently increased dose of citalopram to 40 mg daily. The maximum recommended dose in adults >60 years is 20 mg daily due to increased exposure and risk of QT prolongation.    However, it may be appropriate to continue 40 mg daily since not on other medications that significantly prolong the QT interval (trazodone and hydrochlorothiazide are associated with torsades only when used in excessive doses, in patients with hypokalemia, or when taken with interacting drugs). PCP could consider EKG monitoring every 6-12 months.     Supplements   Stable.    PLAN:                            No medication changes today.    Follow-up: with MTM in 1 year, or sooner if needed    SUBJECTIVE/OBJECTIVE:                          David Mckeon is a 74 year old female seen for an initial visit. She was referred to me from ELVI Nguyen CNP.      Reason for visit: Annual medication review.    Allergies/ADRs: Reviewed in chart  Past Medical History: Reviewed in chart  Tobacco: She reports that she quit smoking about 22 years ago. Her smoking use included cigarettes. She started smoking about 56 years ago. She has never used smokeless tobacco.  Alcohol: Occasional    Medication Adherence/Access: no issues reported.    Diabetes with CKD  Jardiance 25 mg daily  Metformin 1000 mg twice daily  Aspirin 81 mg daily  Patient is not experiencing side effects.  Blood sugar monitoring: never  Current diabetes symptoms: none     Eye exam is up to  date  Foot exam is up to date    Hypertension   Amlodipine 10 mg daily  Losartan-hydrochlorothiazide 100-25 mg daily  Patient reports no current medication side effects.  Patient self monitors blood pressure, typically 138/80s mmHg.    Medication considerations:  Dry cough with lisinopril     BP Readings from Last 6 Encounters:   06/17/25 138/82   12/30/24 132/72   10/08/24 134/70   08/21/24 136/70   05/07/24 132/70   04/11/24 (!) 148/82     Hyperlipidemia   Rosuvastatin 10 mg daily  Patient reports no significant myalgias or other side effects.  The 10-year ASCVD risk score (Jovani FAJARDO, et al., 2019) is: 40.6%    Values used to calculate the score:      Age: 74 years      Sex: Female      Is Non- : No      Diabetic: Yes      Tobacco smoker: No      Systolic Blood Pressure: 138 mmHg      Is BP treated: Yes      HDL Cholesterol: 64 mg/dL      Total Cholesterol: 155 mg/dL     Mental Health (Depression, Anxiety, Insomnia)  Bupropion  mg every morning  Citalopram 40 mg daily - dose increased 6/16/25  Trazodone 100 mg at bedtime - dose increased 6/16/25  Patient reports no current medication side effects.  Mood and anxiety have been worse, but is feeling somewhat better since dose of citalopram was increased. [facility] understanding that it can take several weeks to notice a benefit. She doesn't think bupropion worsened anxiety when she started it. Higher dose of trazodone is working much better, no issues falling or staying asleep. With trazodone 50 mg, she was waking up during the night thinking about things.     Supplements   Fiber caps 4 tablets every evening  Vitamin D 2000 units twice daily-  has been on this dose for 5-10 years  Multivitamin 1 tablet daily  No reported issues at this time.      Today's Vitals: There were no vitals taken for this visit.  ----------------    I spent 15 minutes with this patient today. All changes were made via collaborative practice agreement with Lorna  Nordberg, APRN CNP.     A summary of these recommendations was sent via [employer].    Veronica Ramachandran PharmD (Hailie), MPH  Medication Therapy Management Pharmacist     Telemedicine Visit Details  The patient's medications can be safely assessed via a telemedicine encounter.  Type of service:  Video Conference via [employer]  Originating Location (pt. Location): Home    Distant Location (provider location):  On-site  Start Time: 1:30 PM  End Time: 1:45 PM     Medication Therapy Recommendations  No medication therapy recommendations to display

## 2025-06-30 ENCOUNTER — VIRTUAL VISIT (OUTPATIENT)
Dept: PHARMACY | Facility: CLINIC | Age: 75
End: 2025-06-30
Payer: COMMERCIAL

## 2025-06-30 DIAGNOSIS — I10 ESSENTIAL HYPERTENSION, BENIGN: ICD-10-CM

## 2025-06-30 DIAGNOSIS — E78.5 HYPERLIPIDEMIA WITH TARGET LDL LESS THAN 100: ICD-10-CM

## 2025-06-30 DIAGNOSIS — F41.9 ANXIETY: ICD-10-CM

## 2025-06-30 DIAGNOSIS — N18.31 STAGE 3A CHRONIC KIDNEY DISEASE (H): ICD-10-CM

## 2025-06-30 DIAGNOSIS — N18.31 TYPE 2 DIABETES MELLITUS WITH STAGE 3A CHRONIC KIDNEY DISEASE, WITHOUT LONG-TERM CURRENT USE OF INSULIN (H): Primary | ICD-10-CM

## 2025-06-30 DIAGNOSIS — G47.9 SLEEP DISORDER: ICD-10-CM

## 2025-06-30 DIAGNOSIS — E11.22 TYPE 2 DIABETES MELLITUS WITH STAGE 3A CHRONIC KIDNEY DISEASE, WITHOUT LONG-TERM CURRENT USE OF INSULIN (H): Primary | ICD-10-CM

## 2025-06-30 DIAGNOSIS — F33.1 MODERATE EPISODE OF RECURRENT MAJOR DEPRESSIVE DISORDER (H): ICD-10-CM

## 2025-06-30 DIAGNOSIS — Z78.9 TAKES DIETARY SUPPLEMENTS: ICD-10-CM

## 2025-06-30 PROCEDURE — 99605 MTMS BY PHARM NP 15 MIN: CPT | Mod: 95

## 2025-06-30 NOTE — LETTER
_  Medication List        Prepared on: Jun 30, 2025     Bring your Medication List when you go to the doctor, hospital, or   emergency room. And, share it with your family or caregivers.     Note any changes to how you take your medications.  Cross out medications when you no longer use them.    Medication How I take it Why I use it Prescriber   amLODIPine (NORVASC) 10 MG tablet Take 1 tablet (10 mg) by mouth daily. Hypertension ELVI Nguyen CNP   aspirin (ASA) 81 MG EC tablet Take 81 mg by mouth daily Diabetes Patient Reported   buPROPion (WELLBUTRIN XL) 300 MG 24 hr tablet Take 1 tablet (300 mg) by mouth every morning. Depression ELVI Nguyen CNP   Calcium Polycarbophil (FIBER-CAPS PO) Take 4 tablets by mouth every evening. Supplement Patient Reported   Cholecalciferol (VITAMIN D3) 2000 UNITS CAPS Take 1 capsule by mouth 2 times daily  Supplement Patient Reported   citalopram (CELEXA) 40 MG tablet Take 1 tablet (40 mg) by mouth daily. Depression, Anxiety ELVI Nguyen CNP   empagliflozin (JARDIANCE) 25 MG TABS tablet Take 1 tablet (25 mg) by mouth daily. Diabetes, Kidney Disease ELVI Nguyen CNP   losartan-hydrochlorothiazide (HYZAAR) 100-25 MG tablet Take 1 tablet by mouth daily. Hypertension ELVI Nguyen CNP   metFORMIN (GLUCOPHAGE) 1000 MG tablet Take 1 tablet (1,000 mg) by mouth 2 times daily (with meals). Diabetes ELVI Nguyen CNP   multivitamin, therapeutic with minerals (THERA-VIT-M) TABS Take 1 tablet by mouth daily Supplement Patient Reported   rosuvastatin (CRESTOR) 10 MG tablet Take 1 tablet (10 mg) by mouth daily. Hyperlipidemia ELVI Nguyen CNP   traZODone (DESYREL) 100 MG tablet Take 1 tablet (100 mg) by mouth at bedtime. Insomnia ELVI Nguyen CNP         Add new medications, over-the-counter drugs, herbals, vitamins, or  minerals in the blank rows below.    Medication How I take it Why I use it Prescriber                                       Allergies:      - Aleve [naproxen] - Nausea  - Ibuprofen - Nausea  - Lisinopril - Cough        Side effects I have had:      Not on File        Other Information:              My notes and questions:

## 2025-06-30 NOTE — LETTER
June 30, 2025  David Mckeon  [address]  [place] [place] [address]    Dear Ms. Alhaji Mckeon, DAJA [facility]     Thank you for talking with me on Jun 30, 2025 about your health and medications. As a follow-up to our conversation, I have included two documents:      Your Recommended To-Do List has steps you should take to get the best results from your medications.  Your Medication List will help you keep track of your medications and how to take them.    If you want to talk about these documents, please call Adrienne Ramachandran RPH at phone: [phone], Monday-Friday 8-4:30pm.    I look forward to working with you and your doctors to make sure your medications work well for you.    Sincerely,  Adrienne Ramachandran RPH  [facility] Pharmacist, [facility]

## 2025-06-30 NOTE — LETTER
"Recommended To-Do List      Prepared on: Jun 30, 2025       You can get the best results from your medications by completing the items on this \"To-Do List.\"      Bring your To-Do List when you go to your doctor. And, share it with your family or caregivers.    My To-Do List:  What we talked about: What I should do:     Your medications seem to be working well for you.     Continue taking your medications as prescribed.                "

## 2025-06-30 NOTE — PATIENT INSTRUCTIONS
"Recommendations from today's MTM visit:                                                    MTM (medication therapy management) is a service provided by a clinical pharmacist designed to help you get the most of out of your medicines.   Today we reviewed what your medicines are for, how to know if they are working, that your medicines are safe and how to make your medicine regimen as easy as possible.      No medication changes today.     Follow-up: with MTM in 1 year, or sooner if needed    It was great speaking with you today.  I value your experience and would be very thankful for your time in providing feedback in our clinic survey. In the next few days, you may receive an email or text message from [facility] with a link to a survey related to your  clinical pharmacist.\"     To schedule another MTM appointment, please call the clinic directly or you may call the MTM scheduling line at [phone] or toll-free at [phone].     My Clinical Pharmacist's contact information:                                                      Please feel free to contact me with any questions or concerns you have.      Veronica Ramachandran (Hailie), TayD, MPH  Medication Therapy Management Pharmacist    "

## 2025-07-24 ENCOUNTER — OFFICE VISIT (OUTPATIENT)
Dept: FAMILY MEDICINE | Facility: CLINIC | Age: 75
End: 2025-07-24
Payer: COMMERCIAL

## 2025-07-24 VITALS
HEIGHT: 65 IN | OXYGEN SATURATION: 94 % | TEMPERATURE: 99 F | HEART RATE: 83 BPM | SYSTOLIC BLOOD PRESSURE: 135 MMHG | RESPIRATION RATE: 17 BRPM | WEIGHT: 215.6 LBS | DIASTOLIC BLOOD PRESSURE: 69 MMHG | BODY MASS INDEX: 35.92 KG/M2

## 2025-07-24 DIAGNOSIS — Z01.818 PREOP GENERAL PHYSICAL EXAM: Primary | ICD-10-CM

## 2025-07-24 DIAGNOSIS — I10 HTN, GOAL BELOW 140/90: ICD-10-CM

## 2025-07-24 DIAGNOSIS — N18.31 STAGE 3A CHRONIC KIDNEY DISEASE (H): ICD-10-CM

## 2025-07-24 DIAGNOSIS — N18.31 TYPE 2 DIABETES MELLITUS WITH STAGE 3A CHRONIC KIDNEY DISEASE, WITHOUT LONG-TERM CURRENT USE OF INSULIN (H): ICD-10-CM

## 2025-07-24 DIAGNOSIS — H02.403 EYELID DROOPING DISEASE, BILATERAL: ICD-10-CM

## 2025-07-24 DIAGNOSIS — E11.22 TYPE 2 DIABETES MELLITUS WITH STAGE 3A CHRONIC KIDNEY DISEASE, WITHOUT LONG-TERM CURRENT USE OF INSULIN (H): ICD-10-CM

## 2025-07-24 LAB
ANION GAP SERPL CALCULATED.3IONS-SCNC: 12 MMOL/L (ref 7–15)
BUN SERPL-MCNC: 17.3 MG/DL (ref 8–23)
CALCIUM SERPL-MCNC: 9.7 MG/DL (ref 8.8–10.4)
CHLORIDE SERPL-SCNC: 100 MMOL/L (ref 98–107)
CREAT SERPL-MCNC: 1.08 MG/DL (ref 0.51–0.95)
EGFRCR SERPLBLD CKD-EPI 2021: 53 ML/MIN/1.73M2
GLUCOSE SERPL-MCNC: 98 MG/DL (ref 70–99)
HCO3 SERPL-SCNC: 26 MMOL/L (ref 22–29)
POTASSIUM SERPL-SCNC: 4.8 MMOL/L (ref 3.4–5.3)
SODIUM SERPL-SCNC: 138 MMOL/L (ref 135–145)

## 2025-07-24 NOTE — PROGRESS NOTES
Preoperative Evaluation  [facility]  [address] [facility] [address]  Phone: [phone]  Primary Provider: ELVI Nguyen CNP  Pre-op Performing Provider: Kishore Dixon PA-C  Jul 24, 2025 7/23/2025   Surgical Information   What procedure is being done? Blepharoplasty- Sedation only   Facility or Hospital where procedure/surgery will be performed: [facility]   Who is doing the procedure / surgery? Montana Castro MD   Date of surgery / procedure: 08/01/2025   Time of surgery / procedure: 1:30 P.M.   Where do you plan to recover after surgery? at home with family     Fax number for surgical facility: [phone]      Assessment & Plan     The proposed surgical procedure is considered INTERMEDIATE risk.    Preop general physical exam    Cleared for surgery.    - Basic metabolic panel  (Ca, Cl, CO2, Creat, Gluc, K, Na, BUN); Future  - Basic metabolic panel  (Ca, Cl, CO2, Creat, Gluc, K, Na, BUN)      Eyelid drooping disease, bilateral    Blepharoplasty planned.      Stage 3a chronic kidney disease (H)    Rechecking BMP today. Has been stable.    - Basic metabolic panel  (Ca, Cl, CO2, Creat, Gluc, K, Na, BUN); Future  - Basic metabolic panel  (Ca, Cl, CO2, Creat, Gluc, K, Na, BUN)      Type 2 diabetes mellitus with stage 3a chronic kidney disease, without long-term current use of insulin (H)    Very well controlled.    - Basic metabolic panel  (Ca, Cl, CO2, Creat, Gluc, K, Na, BUN); Future  - Basic metabolic panel  (Ca, Cl, CO2, Creat, Gluc, K, Na, BUN)      HTN, goal below 140/90    Well controlled.        - No identified additional risk factors other than previously addressed    Antiplatelet or Anticoagulation Medication Instructions   - aspirin: Discontinue aspirin 7 days prior to procedure to reduce bleeding risk. It should be resumed postoperatively.     Additional Medication Instructions   - ACE/ARB/ARNI (lisinopril, enalapril, losartan,  valsartan, olmesartan, sacubritril/valsartan) : Continue without modification (e.g., MAC anesthesia, neurosurgery, spine surgery, heart failure, or labile hypertension with risk of hypertension).   - Calcium Channel Blockers (amlodipine, diltiazem, felodipine): May be continued on the day of surgery.   - Diuretics (furosemide, hydrochlorothiazide, chlorothalidone): DO NOT TAKE on the day of surgery.   - Statins (atorvastatin, simvastatin, pravastatin) : Continue taking on the day of surgery.    - metformin: DO NOT TAKE day of surgery.   - SGLT2 Inhibitor (canagliflozin, dapagliflozin, or empagliflozin): DO NOT TAKE 3 days before surgery.    - SSRIs, SNRIs, TCAs, Antipsychotics: Continue without modification.     Recommendation  Approval given to proceed with proposed procedure, without further diagnostic evaluation.      The longitudinal plan of care for the diagnosis(es)/condition(s) as documented were addressed during this visit. Due to the added complexity in care, I will continue to support David in the subsequent management and with ongoing continuity of care.      Rachelle Hernandez is a 74 year old, presenting for the following:  Pre-Op Exam          7/24/2025    11:06 AM   Additional Questions   Roomed by Shannon DONNELLY: Bilateral eyelid dropping, affecting vision          7/23/2025   Pre-Op Questionnaire   Have you ever had a heart attack or stroke? No   Have you ever had surgery on your heart or blood vessels, such as a stent placement, a coronary artery bypass, or surgery on an artery in your head, neck, heart, or legs? No   Do you have chest pain with activity? No   Do you have a history of heart failure? No   Do you currently have a cold, bronchitis or symptoms of other infection? No   Do you have a cough, shortness of breath, or wheezing? No   Do you or anyone in your family have previous history of blood clots? (!) YES -  and son   Do you or does anyone in your family have a serious  bleeding problem such as prolonged bleeding following surgeries or cuts? No   Have you ever had problems with anemia or been told to take iron pills? No   Have you had any abnormal blood loss such as black, tarry or bloody stools, or abnormal vaginal bleeding? No   Have you ever had a blood transfusion? No   Are you willing to have a blood transfusion if it is medically needed before, during, or after your surgery? Yes   Have you or any of your relatives ever had problems with anesthesia? No   Do you have sleep apnea, excessive snoring or daytime drowsiness? No   Do you have any artifical heart valves or other implanted medical devices like a pacemaker, defibrillator, or continuous glucose monitor? No   Do you have artificial joints? (!) YES- right knee   Are you allergic to latex? No     Advance Care Planning    Document on file is a Health Care Directive or POLST.    Preoperative Review of    reviewed - no record of controlled substances prescribed.      Status of Chronic Conditions:  DIABETES - Patient has a longstanding history of DiabetesType Type II . Patient is being treated with oral agents and denies significant side effects. Control has been good. Complicating factors include but are not limited to: hypertension and chronic kidney disease.     HYPERTENSION - Patient has longstanding history of HTN , currently denies any symptoms referable to elevated blood pressure. Specifically denies chest pain, palpitations, dyspnea, orthopnea, PND or peripheral edema. Blood pressure readings have been in normal range. Current medication regimen is as listed below. Patient denies any side effects of medication.     RENAL INSUFFICIENCY - Patient has a longstanding history of moderate-severe chronic renal insufficiency. Last Cr 1.06.     Patient Active Problem List    Diagnosis Date Noted    Osteopenia of multiple sites 09/05/2024     Priority: Medium    Class 2 severe obesity due to excess calories with serious  comorbidity in adult (H) 08/21/2024     Priority: Medium    Anxiety 05/07/2024     Priority: Medium    Stage 3a chronic kidney disease (H) 02/22/2023     Priority: Medium    S/P total knee replacement using cement, right 05/23/2016     Priority: Medium    Type 2 diabetes mellitus with stage 3a chronic kidney disease, without long-term current use of insulin (H) 05/06/2016     Priority: Medium    Essential hypertension, benign 05/06/2016     Priority: Medium    Diverticulosis of large intestine without hemorrhage 07/28/2015     Priority: Medium    Hemorrhoids 07/28/2015     Priority: Medium    S/P colonoscopy with polypectomy 07/28/2015     Priority: Medium    Mild major depression (H) 06/30/2014     Priority: Medium     Quality        Hyperlipidemia with target LDL less than 100 06/30/2014     Priority: Medium     Diagnosis updated by automated process. Provider to review and confirm.      History of colonic polyps 05/19/2014     Priority: Medium    CARDIOVASCULAR SCREENING; LDL GOAL LESS THAN 130 05/19/2014     Priority: Medium      Past Medical History:   Diagnosis Date    High cholesterol     Hypertension     Mild major depression 06/30/2014    Osteoarthritis     right knee    Stage 3a chronic kidney disease (H) 02/22/2023    Type 2 diabetes mellitus (H)      Past Surgical History:   Procedure Laterality Date    ARTHROPLASTY KNEE Right 05/23/2016    Procedure: ARTHROPLASTY KNEE;  Surgeon: Pankaj Schuster MD;  Location:  OR    CARPAL TUNNEL RELEASE RT/LT Bilateral     COLONOSCOPY  10/2018    COLONOSCOPY N/A 10/08/2024    Procedure: Colonoscopy with polypectomies using cold snare;  Surgeon: Apoorva Reyes MD;  Location:  GI    EYE SURGERY      glasses    SOFT TISSUE SURGERY      carpal tunnel-both hands    STAPEDECTOMY Bilateral      Current Outpatient Medications   Medication Sig Dispense Refill    amLODIPine (NORVASC) 10 MG tablet Take 1 tablet (10 mg) by mouth daily. 90 tablet 1    aspirin  (ASA) 81 MG EC tablet Take 81 mg by mouth daily      buPROPion (WELLBUTRIN XL) 300 MG 24 hr tablet Take 1 tablet (300 mg) by mouth every morning. 90 tablet 1    Calcium Polycarbophil (FIBER-CAPS PO) Take 4 tablets by mouth every evening.      Cholecalciferol (VITAMIN D3) 2000 UNITS CAPS Take 1 capsule by mouth 2 times daily       citalopram (CELEXA) 40 MG tablet Take 1 tablet (40 mg) by mouth daily. 90 tablet 0    empagliflozin (JARDIANCE) 25 MG TABS tablet Take 1 tablet (25 mg) by mouth daily. 90 tablet 1    losartan-hydrochlorothiazide (HYZAAR) 100-25 MG tablet Take 1 tablet by mouth daily. 90 tablet 1    metFORMIN (GLUCOPHAGE) 1000 MG tablet Take 1 tablet (1,000 mg) by mouth 2 times daily (with meals). 180 tablet 1    multivitamin, therapeutic with minerals (THERA-VIT-M) TABS Take 1 tablet by mouth daily      rosuvastatin (CRESTOR) 10 MG tablet Take 1 tablet (10 mg) by mouth daily. 90 tablet 1    traZODone (DESYREL) 100 MG tablet Take 1 tablet (100 mg) by mouth at bedtime. 90 tablet 1       Allergies   Allergen Reactions    Aleve [Naproxen] Nausea    Ibuprofen Nausea    Lisinopril Cough     DRY COUGH        Social History     Tobacco Use    Smoking status: Former     Current packs/day: 0.00     Types: Cigarettes     Start date: 1968     Quit date: 2003     Years since quittin.1    Smokeless tobacco: Never   Substance Use Topics    Alcohol use: Yes     Comment: occasional     Family History   Problem Relation Age of Onset    Sudden Death Mother         MVA    Cerebrovascular Disease Father     Myocardial Infarction Maternal Grandmother     Cancer Maternal Grandfather         Leukemia    Myocardial Infarction Maternal Grandfather     Asthma Paternal Grandmother     Myocardial Infarction Paternal Grandmother     Myocardial Infarction Paternal Grandfather     Heart Disease Brother         Pacemaker    High cholesterol Brother     Hearing Loss Brother     High cholesterol Sister     Heart Disease Sister   "       Pacemaker    Blood Disease Sister     Arthritis Sister     Cancer Sister     High cholesterol Sister     Psoriasis Son     Crohn's Disease Son     Psoriasis Daughter     Cancer Sister         breast cancer, both sisters    Coronary Artery Disease Sister         Congestive heart failure    Breast Cancer Sister         masectomy    Vision Loss Sister     Known Genetic Syndrome Sister         Polycythemia Vera    Coronary Artery Disease Brother         Congestive heart failure    Arthritis Brother     Heart Disease Brother     Nephrolithiasis Brother     Hearing Loss Brother     Known Genetic Syndrome Brother         Macular degeneration    Breast Cancer Sister         radiation/chemo    Cancer Sister     Hypertension No family hx of      History   Drug Use No             Review of Systems  CONSTITUTIONAL: NEGATIVE for fever, chills, change in weight  INTEGUMENTARY/SKIN: NEGATIVE for worrisome rashes, moles or lesions  EYES: NEGATIVE for vision changes or irritation  ENT/MOUTH: NEGATIVE for ear, mouth and throat problems  RESP: NEGATIVE for significant cough or SOB  BREAST: NEGATIVE for masses, tenderness or discharge  CV: NEGATIVE for chest pain, palpitations or peripheral edema  GI: NEGATIVE for nausea, abdominal pain, heartburn, or change in bowel habits  : NEGATIVE for frequency, dysuria, or hematuria  MUSCULOSKELETAL: NEGATIVE for significant arthralgias or myalgia  NEURO: NEGATIVE for weakness, dizziness or paresthesias  ENDOCRINE: NEGATIVE for temperature intolerance, skin/hair changes  HEME: NEGATIVE for bleeding problems  PSYCHIATRIC: NEGATIVE for changes in mood or affect      Objective    /69 (BP Location: Right arm, Patient Position: Sitting, Cuff Size: Adult Large)   Pulse 83   Temp 99  F (37.2  C) (Oral)   Resp 17   Ht 1.651 m (5' 5\")   Wt 97.8 kg (215 lb 9.6 oz)   SpO2 94%   BMI 35.88 kg/m     Estimated body mass index is 35.88 kg/m  as calculated from the following:    Height as of " "this encounter: 1.651 m (5' 5\").    Weight as of this encounter: 97.8 kg (215 lb 9.6 oz).      Physical Exam  GENERAL: alert and no distress  EYES: Eyes grossly normal to inspection, PERRL and conjunctivae and sclerae normal  HENT: ear canals and TM's normal, nose and mouth without ulcers or lesions  RESP: lungs clear to auscultation - no rales, rhonchi or wheezes  CV: regular rate and rhythm, normal S1 S2, no S3 or S4, no murmur, click or rub, no peripheral edema  MS: no gross musculoskeletal defects noted, no edema  SKIN: no suspicious lesions or rashes  NEURO: Normal strength and tone, mentation intact and speech normal  PSYCH: mentation appears normal, affect normal/bright  LYMPH: no cervical, supraclavicular, axillary, or inguinal adenopathy    Recent Labs   Lab Test 06/17/25  1101 12/30/24  1618 08/21/24  1048   HGB 14.3  --  14.7   PLT  --   --  334   NA  --   --  140   POTASSIUM  --   --  4.6   CR  --   --  1.06*   A1C 5.9* 6.0* 5.9*        Diagnostics  Labs pending at this time.  Results will be reviewed when available.   No EKG required, no history of coronary heart disease, significant arrhythmia, peripheral arterial disease or other structural heart disease.- per instructions, not required for MAC anesthesia    Revised Cardiac Risk Index (RCRI)  The patient has the following serious cardiovascular risks for perioperative complications:   - No serious cardiac risks = 0 points     RCRI Interpretation: 0 points: Class I (very low risk - 0.4% complication rate)         Signed Electronically by: Kishore Dixon PA-C  A copy of this evaluation report is provided to the requesting physician.         "

## 2025-07-24 NOTE — PATIENT INSTRUCTIONS
Stop aspirin now.   Stop Jardiance 3 days before surgery.   Do not take losartan-hydrochlorothiazide or metformin the morning of surgery.      - ACE/ARB/ARNI (lisinopril, enalapril, losartan, valsartan, olmesartan, sacubritril/valsartan) : Continue without modification (e.g., MAC anesthesia, neurosurgery, spine surgery, heart failure, or labile hypertension with risk of hypertension).   - Calcium Channel Blockers (amlodipine, diltiazem, felodipine): May be continued on the day of surgery.   - Diuretics (furosemide, hydrochlorothiazide, chlorothalidone): DO NOT TAKE on the day of surgery.   - Statins (atorvastatin, simvastatin, pravastatin) : Continue taking on the day of surgery.    - metformin: DO NOT TAKE day of surgery.   - SGLT2 Inhibitor (canagliflozin, dapagliflozin, or empagliflozin): DO NOT TAKE 3 days before surgery.    - SSRIs, SNRIs, TCAs, Antipsychotics: Continue without modification.

## 2025-09-04 ENCOUNTER — OFFICE VISIT (OUTPATIENT)
Dept: FAMILY MEDICINE | Facility: CLINIC | Age: 75
End: 2025-09-04
Payer: COMMERCIAL

## 2025-09-04 VITALS
SYSTOLIC BLOOD PRESSURE: 138 MMHG | HEART RATE: 82 BPM | OXYGEN SATURATION: 95 % | TEMPERATURE: 98.3 F | DIASTOLIC BLOOD PRESSURE: 79 MMHG | RESPIRATION RATE: 12 BRPM

## 2025-09-04 DIAGNOSIS — K82.8 PORCELAIN GALLBLADDER: ICD-10-CM

## 2025-09-04 DIAGNOSIS — R26.89 BALANCE PROBLEMS: ICD-10-CM

## 2025-09-04 DIAGNOSIS — K38.9 DISEASE OF APPENDIX: Primary | ICD-10-CM

## 2025-09-04 DIAGNOSIS — S82.401D CLOSED FRACTURE OF SHAFT OF RIGHT FIBULA WITH ROUTINE HEALING, UNSPECIFIED FRACTURE MORPHOLOGY, SUBSEQUENT ENCOUNTER: ICD-10-CM

## 2025-09-04 DIAGNOSIS — S22.019D CLOSED FRACTURE OF FIRST THORACIC VERTEBRA WITH ROUTINE HEALING, UNSPECIFIED FRACTURE MORPHOLOGY, SUBSEQUENT ENCOUNTER: ICD-10-CM

## 2025-09-04 RX ORDER — TRAZODONE HYDROCHLORIDE 50 MG/1
50 TABLET ORAL AT BEDTIME
COMMUNITY

## 2025-09-04 ASSESSMENT — PAIN SCALES - GENERAL: PAINLEVEL_OUTOF10: NO PAIN (0)

## (undated) DEVICE — ENDO SNARE POLYPECTOMY OVAL 15MM LOOP SD-240U-15

## (undated) DEVICE — ENDO TRAP POLYP QUICK CATCH 710201

## (undated) DEVICE — KIT ENDO TURNOVER/PROCEDURE W/CLEAN A SCOPE LINERS 103888

## (undated) RX ORDER — FENTANYL CITRATE 50 UG/ML
INJECTION, SOLUTION INTRAMUSCULAR; INTRAVENOUS
Status: DISPENSED
Start: 2024-10-08